# Patient Record
Sex: FEMALE | Race: WHITE | NOT HISPANIC OR LATINO | Employment: FULL TIME | URBAN - METROPOLITAN AREA
[De-identification: names, ages, dates, MRNs, and addresses within clinical notes are randomized per-mention and may not be internally consistent; named-entity substitution may affect disease eponyms.]

---

## 2023-09-11 LAB
ALANINE AMINOTRANSFERASE (SGPT) (U/L) IN SER/PLAS: 14 U/L (ref 7–45)
ALBUMIN (G/DL) IN SER/PLAS: 4.3 G/DL (ref 3.4–5)
ALKALINE PHOSPHATASE (U/L) IN SER/PLAS: 72 U/L (ref 33–110)
ANION GAP IN SER/PLAS: 13 MMOL/L (ref 10–20)
ASPARTATE AMINOTRANSFERASE (SGOT) (U/L) IN SER/PLAS: 18 U/L (ref 9–39)
BILIRUBIN TOTAL (MG/DL) IN SER/PLAS: 0.3 MG/DL (ref 0–1.2)
CALCIUM (MG/DL) IN SER/PLAS: 9.8 MG/DL (ref 8.6–10.6)
CARBON DIOXIDE, TOTAL (MMOL/L) IN SER/PLAS: 28 MMOL/L (ref 21–32)
CHLORIDE (MMOL/L) IN SER/PLAS: 104 MMOL/L (ref 98–107)
CREATININE (MG/DL) IN SER/PLAS: 0.69 MG/DL (ref 0.5–1.05)
GFR FEMALE: >90 ML/MIN/1.73M2
GLUCOSE (MG/DL) IN SER/PLAS: 95 MG/DL (ref 74–99)
POTASSIUM (MMOL/L) IN SER/PLAS: 4.5 MMOL/L (ref 3.5–5.3)
PROTEIN TOTAL: 7.8 G/DL (ref 6.4–8.2)
SODIUM (MMOL/L) IN SER/PLAS: 140 MMOL/L (ref 136–145)
UREA NITROGEN (MG/DL) IN SER/PLAS: 19 MG/DL (ref 6–23)

## 2023-11-11 PROBLEM — C85.90 LYMPHOMA (MULTI): Status: ACTIVE | Noted: 2023-11-11

## 2023-11-13 ENCOUNTER — OFFICE VISIT (OUTPATIENT)
Dept: PRIMARY CARE | Facility: CLINIC | Age: 43
End: 2023-11-13
Payer: COMMERCIAL

## 2023-11-13 VITALS
OXYGEN SATURATION: 97 % | SYSTOLIC BLOOD PRESSURE: 109 MMHG | HEART RATE: 84 BPM | DIASTOLIC BLOOD PRESSURE: 77 MMHG | TEMPERATURE: 97.5 F | BODY MASS INDEX: 35.53 KG/M2 | HEIGHT: 60 IN | WEIGHT: 181 LBS

## 2023-11-13 DIAGNOSIS — E78.2 MIXED HYPERLIPIDEMIA: ICD-10-CM

## 2023-11-13 DIAGNOSIS — Z11.59 NEED FOR HEPATITIS C SCREENING TEST: ICD-10-CM

## 2023-11-13 DIAGNOSIS — E55.9 VITAMIN D DEFICIENCY: ICD-10-CM

## 2023-11-13 DIAGNOSIS — Z80.0 FAMILY HX OF COLON CANCER: ICD-10-CM

## 2023-11-13 DIAGNOSIS — Z00.00 ANNUAL PHYSICAL EXAM: Primary | ICD-10-CM

## 2023-11-13 DIAGNOSIS — F32.A ANXIETY AND DEPRESSION: ICD-10-CM

## 2023-11-13 DIAGNOSIS — Z23 ENCOUNTER FOR IMMUNIZATION: ICD-10-CM

## 2023-11-13 DIAGNOSIS — Z12.31 ENCOUNTER FOR SCREENING MAMMOGRAM FOR BREAST CANCER: ICD-10-CM

## 2023-11-13 DIAGNOSIS — R73.9 HYPERGLYCEMIA: ICD-10-CM

## 2023-11-13 DIAGNOSIS — Z12.11 COLON CANCER SCREENING: ICD-10-CM

## 2023-11-13 DIAGNOSIS — Z01.89 ENCOUNTER FOR ROUTINE LABORATORY TESTING: ICD-10-CM

## 2023-11-13 DIAGNOSIS — F41.9 ANXIETY AND DEPRESSION: ICD-10-CM

## 2023-11-13 PROCEDURE — 1036F TOBACCO NON-USER: CPT | Performed by: NURSE PRACTITIONER

## 2023-11-13 PROCEDURE — 90686 IIV4 VACC NO PRSV 0.5 ML IM: CPT | Performed by: NURSE PRACTITIONER

## 2023-11-13 PROCEDURE — 99386 PREV VISIT NEW AGE 40-64: CPT | Performed by: NURSE PRACTITIONER

## 2023-11-13 RX ORDER — PROPRANOLOL HYDROCHLORIDE 10 MG/1
TABLET ORAL
COMMUNITY
Start: 2023-10-23

## 2023-11-13 RX ORDER — DULOXETIN HYDROCHLORIDE 20 MG/1
20 CAPSULE, DELAYED RELEASE ORAL 2 TIMES DAILY
COMMUNITY
Start: 2023-10-24

## 2023-11-13 ASSESSMENT — PATIENT HEALTH QUESTIONNAIRE - PHQ9
2. FEELING DOWN, DEPRESSED OR HOPELESS: NOT AT ALL
SUM OF ALL RESPONSES TO PHQ9 QUESTIONS 1 AND 2: 0
1. LITTLE INTEREST OR PLEASURE IN DOING THINGS: NOT AT ALL

## 2023-11-13 ASSESSMENT — ENCOUNTER SYMPTOMS
BLOOD IN STOOL: 0
CHILLS: 0
COUGH: 0
DIZZINESS: 0
NAUSEA: 0
FEVER: 0
LOSS OF SENSATION IN FEET: 0
POLYDIPSIA: 0
NECK PAIN: 0
SLEEP DISTURBANCE: 0
OCCASIONAL FEELINGS OF UNSTEADINESS: 0
CHEST TIGHTNESS: 0
DYSURIA: 0
APPETITE CHANGE: 0
HEADACHES: 0
DEPRESSION: 0
VOMITING: 0
DIAPHORESIS: 0
FATIGUE: 0
SEIZURES: 0
FLANK PAIN: 0
WOUND: 0
CONFUSION: 0
BACK PAIN: 0
POLYPHAGIA: 0
ABDOMINAL PAIN: 0
UNEXPECTED WEIGHT CHANGE: 0
HEMATURIA: 0
PALPITATIONS: 0
LIGHT-HEADEDNESS: 0
ACTIVITY CHANGE: 0
SPEECH DIFFICULTY: 0
NERVOUS/ANXIOUS: 1
WHEEZING: 0
SHORTNESS OF BREATH: 0

## 2023-11-13 ASSESSMENT — PAIN SCALES - GENERAL: PAINLEVEL: 0-NO PAIN

## 2023-11-13 NOTE — PROGRESS NOTES
[facility]: [facility]  PHYSICAL EXAM      Jaz Low is a 43 y.o. female that is presenting today for New Patient to Establish and CPE.  She is followed by Dr. Chinmay Landaverde for Lymphoma and was last seen 09/2023. She is scheduled to follow up 03/2024. Patient is followed by psychiatry through [facility] for depression and anxiety. This is managed well with Cymbalta and Propanolol. She reports a FMHX of colon cancer and was to have initial colonoscopy with previous provider but was unable to follow through with that. Requesting referral.    Assessment/Plan   Diagnoses and all orders for this visit:  Annual physical exam  Encounter for routine laboratory testing  -     CBC; Future  -     Basic Metabolic Panel; Future  -     Lipid Panel; Future  -     Hemoglobin A1C; Future  -     Vitamin D 25-Hydroxy,Total (for eval of Vitamin D levels); Future  Mixed hyperlipidemia  -     Lipid Panel; Future  Encounter for screening mammogram for breast cancer  -     BI mammo bilateral screening tomosynthesis; Future  Need for hepatitis C screening test  -     Hepatitis C antibody; Future  Anxiety and depression  Hyperglycemia  -     Hemoglobin A1C; Future  Encounter for immunization  -     Flu vaccine (IIV4) age 6 months and greater, preservative free  Vitamin D deficiency  -     Vitamin D 25-Hydroxy,Total (for eval of Vitamin D levels); Future  Colon cancer screening  -     Referral to Gastroenterology; Future  Family hx of colon cancer  -     Referral to Gastroenterology; Future  Subjective   HPI  Review of Systems   Constitutional:  Negative for activity change, appetite change, chills, diaphoresis, fatigue, fever and unexpected weight change.   HENT:  Negative for hearing loss and mouth sores.    Eyes:  Negative for visual disturbance.        Wears corrective lenses   Respiratory:  Negative for cough, chest tightness, shortness of breath and wheezing.    Cardiovascular:  Negative for chest pain,  palpitations and leg swelling.   Gastrointestinal:  Negative for abdominal pain, blood in stool, nausea and vomiting.   Endocrine: Negative for cold intolerance, heat intolerance, polydipsia, polyphagia and polyuria.   Genitourinary:  Negative for dysuria, flank pain and hematuria.   Musculoskeletal:  Negative for back pain and neck pain.   Skin:  Negative for rash and wound.   Allergic/Immunologic: Negative for environmental allergies and food allergies.   Neurological:  Negative for dizziness, seizures, syncope, speech difficulty, light-headedness and headaches.   Psychiatric/Behavioral:  Negative for confusion, sleep disturbance and suicidal ideas. The patient is nervous/anxious (depresssion followed by psychiatry).       Objective   Vitals:    11/13/23 1110   BP: 109/77   Pulse: 84   Temp: 36.4 °C (97.5 °F)   SpO2: 97%     Body mass index is 35.35 kg/m².  Physical Exam  Vitals and nursing note reviewed.   Constitutional:       General: She is not in acute distress.     Appearance: Normal appearance. She is not ill-appearing.   HENT:      Head: Normocephalic and atraumatic.      Right Ear: Tympanic membrane, ear canal and external ear normal. There is no impacted cerumen.      Left Ear: Tympanic membrane, ear canal and external ear normal. There is no impacted cerumen.      Nose: Nose normal.      Mouth/Throat:      Mouth: Mucous membranes are moist.      Pharynx: Oropharynx is clear. No oropharyngeal exudate or posterior oropharyngeal erythema.   Eyes:      General: No scleral icterus.        Right eye: No discharge.         Left eye: No discharge.      Extraocular Movements: Extraocular movements intact.      Conjunctiva/sclera: Conjunctivae normal.      Pupils: Pupils are equal, round, and reactive to light.   Neck:      Vascular: No carotid bruit.   Cardiovascular:      Rate and Rhythm: Normal rate and regular rhythm.      Pulses: Normal pulses.      Heart sounds: Normal heart sounds. No murmur heard.     No  "friction rub. No gallop.   Pulmonary:      Effort: Pulmonary effort is normal. No respiratory distress.      Breath sounds: Normal breath sounds.   Abdominal:      General: Abdomen is flat. Bowel sounds are normal. There is no distension.      Palpations: Abdomen is soft.      Tenderness: There is no abdominal tenderness.      Hernia: No hernia is present.   Musculoskeletal:         General: No swelling or tenderness. Normal range of motion.   Lymphadenopathy:      Cervical: No cervical adenopathy.   Skin:     General: Skin is warm and dry.      Capillary Refill: Capillary refill takes less than 2 seconds.      Coloration: Skin is not jaundiced.      Findings: No rash.   Neurological:      General: No focal deficit present.      Mental Status: She is alert and oriented to person, place, and time. Mental status is at baseline.   Psychiatric:         Mood and Affect: Mood normal.         Behavior: Behavior normal.       Diagnostic Results   Lab Results   Component Value Date    GLUCOSE 95 09/11/2023    CALCIUM 9.8 09/11/2023     09/11/2023    K 4.5 09/11/2023    CO2 28 09/11/2023     09/11/2023    BUN 19 09/11/2023    CREATININE 0.69 09/11/2023     Lab Results   Component Value Date    ALT 14 09/11/2023    AST 18 09/11/2023    ALKPHOS 72 09/11/2023    BILITOT 0.3 09/11/2023     Lab Results   Component Value Date    WBC 5.1 07/10/2023    HGB 11.6 (L) 07/10/2023    HCT 34.9 (L) 07/10/2023    MCV 86 07/10/2023     07/10/2023     Lab Results   Component Value Date    CHOL 245 (H) 11/16/2022    CHOL 205 (H) 10/19/2020    CHOL 217 (H) 09/28/2019     Lab Results   Component Value Date    HDL 53.0 11/16/2022    HDL 53.0 10/19/2020    HDL 49.0 09/28/2019     No results found for: \"LDLCALC\"  Lab Results   Component Value Date    TRIG 144 11/16/2022    TRIG 96 10/19/2020    TRIG 81 09/28/2019     No components found for: \"CHOLHDL\"  No results found for: \"HGBA1C\"  Other labs not included in the list above were " reviewed either before or during this encounter.    History   Past Medical History:   Diagnosis Date    Anemia     Anxiety     Cancer (CMS/HCC)     Depression      Past Surgical History:   Procedure Laterality Date    CT GUIDED PERCUTANEOUS BIOPSY MUSCLE  9/14/2021    CT GUIDED PERCUTANEOUS BIOPSY MUSCLE 9/14/2021 [facility]     Family History   Problem Relation Name Age of Onset    Other (Diabetes) Mother      Hyperlipidemia Father       Social History     Socioeconomic History    Marital status:      Spouse name: Not on file    Number of children: Not on file    Years of education: Not on file    Highest education level: Not on file   Occupational History    Not on file   Tobacco Use    Smoking status: Former     Types: Cigarettes     Passive exposure: Past    Smokeless tobacco: Never   Vaping Use    Vaping Use: Never used   Substance and Sexual Activity    Alcohol use: Yes    Drug use: Never    Sexual activity: Not on file   Other Topics Concern    Not on file   Social History Narrative    Not on file     Social Determinants of Health     Financial Resource Strain: Not on file   Food Insecurity: Not on file   Transportation Needs: Not on file   Physical Activity: Not on file   Stress: Not on file   Social Connections: Not on file   Intimate Partner Violence: Not on file   Housing Stability: Not on file     No Known Allergies  Current Outpatient Medications on File Prior to Visit   Medication Sig Dispense Refill    DULoxetine (Cymbalta) 20 mg DR capsule Take 1 capsule (20 mg) by mouth 2 times a day.      propranolol (Inderal) 10 mg tablet prn       No current facility-administered medications on file prior to visit.     Immunization History   Administered Date(s) Administered    Pfizer Purple Cap SARS-CoV-2 03/27/2021, 04/18/2021     Patient's medical history was reviewed and updated either before or during this encounter.       Shelly Goldsmith, APRN-CNP

## 2024-03-05 ENCOUNTER — TELEPHONE (OUTPATIENT)
Dept: PRIMARY CARE | Facility: CLINIC | Age: 44
End: 2024-03-05
Payer: COMMERCIAL

## 2024-03-05 NOTE — TELEPHONE ENCOUNTER
PATIENT STATES THAT SHE HAS A SCORE  THROAT ON THE RIGHT SIDE AND THE RIGHT SIDE OF HER EAR AND THIS STARTED SUNDAY PATIENT STATES THAT SHE HAS NO OTHER SYMPTOMS PLEASE ADVISE

## 2024-03-05 NOTE — TELEPHONE ENCOUNTER
Advise patient to utilize OTC medications to manage symptoms. It has only been 3 days. If she develops fevers or symptoms persist after 7 days, contact the office.

## 2024-03-05 NOTE — TELEPHONE ENCOUNTER
Pt advised as noted, pt states she has had strep in the past and states this feels the same, pt has [facility] appointment tonite and will obtain a strep culture.

## 2024-03-12 ENCOUNTER — APPOINTMENT (OUTPATIENT)
Dept: PRIMARY CARE | Facility: CLINIC | Age: 44
End: 2024-03-12
Payer: COMMERCIAL

## 2024-03-25 ENCOUNTER — APPOINTMENT (OUTPATIENT)
Dept: HEMATOLOGY/ONCOLOGY | Facility: CLINIC | Age: 44
End: 2024-03-25
Payer: COMMERCIAL

## 2024-03-29 ENCOUNTER — LAB (OUTPATIENT)
Dept: LAB | Facility: CLINIC | Age: 44
End: 2024-03-29
Payer: COMMERCIAL

## 2024-03-29 DIAGNOSIS — D50.0 IRON DEFICIENCY ANEMIA DUE TO CHRONIC BLOOD LOSS: ICD-10-CM

## 2024-03-29 DIAGNOSIS — E78.2 MIXED HYPERLIPIDEMIA: ICD-10-CM

## 2024-03-29 DIAGNOSIS — E55.9 VITAMIN D DEFICIENCY: ICD-10-CM

## 2024-03-29 DIAGNOSIS — R63.5 WEIGHT GAIN, ABNORMAL: ICD-10-CM

## 2024-03-29 DIAGNOSIS — D50.0 IRON DEFICIENCY ANEMIA DUE TO CHRONIC BLOOD LOSS: Primary | ICD-10-CM

## 2024-03-29 DIAGNOSIS — C83.30 DIFFUSE LARGE B-CELL LYMPHOMA, UNSPECIFIED BODY REGION (MULTI): ICD-10-CM

## 2024-03-29 DIAGNOSIS — Z01.89 ENCOUNTER FOR ROUTINE LABORATORY TESTING: ICD-10-CM

## 2024-03-29 DIAGNOSIS — R73.9 HYPERGLYCEMIA: ICD-10-CM

## 2024-03-29 DIAGNOSIS — Z11.59 NEED FOR HEPATITIS C SCREENING TEST: ICD-10-CM

## 2024-03-29 LAB
25(OH)D3 SERPL-MCNC: 33 NG/ML (ref 30–100)
ALBUMIN SERPL BCP-MCNC: 4.2 G/DL (ref 3.4–5)
ALP SERPL-CCNC: 67 U/L (ref 33–110)
ALT SERPL W P-5'-P-CCNC: 17 U/L (ref 7–45)
ANION GAP SERPL CALC-SCNC: 12 MMOL/L (ref 10–20)
AST SERPL W P-5'-P-CCNC: 22 U/L (ref 9–39)
BASOPHILS # BLD AUTO: 0.02 X10*3/UL (ref 0–0.1)
BASOPHILS NFR BLD AUTO: 0.4 %
BILIRUB SERPL-MCNC: 0.3 MG/DL (ref 0–1.2)
BUN SERPL-MCNC: 14 MG/DL (ref 6–23)
CALCIUM SERPL-MCNC: 9.1 MG/DL (ref 8.6–10.3)
CHLORIDE SERPL-SCNC: 105 MMOL/L (ref 98–107)
CHOLEST SERPL-MCNC: 245 MG/DL (ref 0–199)
CHOLESTEROL/HDL RATIO: 4.5
CO2 SERPL-SCNC: 26 MMOL/L (ref 21–32)
CREAT SERPL-MCNC: 0.64 MG/DL (ref 0.5–1.05)
EGFRCR SERPLBLD CKD-EPI 2021: >90 ML/MIN/1.73M*2
EOSINOPHIL # BLD AUTO: 0.02 X10*3/UL (ref 0–0.7)
EOSINOPHIL NFR BLD AUTO: 0.4 %
ERYTHROCYTE [DISTWIDTH] IN BLOOD BY AUTOMATED COUNT: 13.2 % (ref 11.5–14.5)
ERYTHROCYTE [DISTWIDTH] IN BLOOD BY AUTOMATED COUNT: 13.3 % (ref 11.5–14.5)
EST. AVERAGE GLUCOSE BLD GHB EST-MCNC: 105 MG/DL
FERRITIN SERPL-MCNC: 62 NG/ML (ref 8–150)
GLUCOSE SERPL-MCNC: 93 MG/DL (ref 74–99)
HBA1C MFR BLD: 5.3 %
HCT VFR BLD AUTO: 35.7 % (ref 36–46)
HCT VFR BLD AUTO: 35.8 % (ref 36–46)
HCV AB SER QL: NONREACTIVE
HDLC SERPL-MCNC: 54.3 MG/DL
HGB BLD-MCNC: 11.5 G/DL (ref 12–16)
HGB BLD-MCNC: 11.6 G/DL (ref 12–16)
IMM GRANULOCYTES # BLD AUTO: 0.01 X10*3/UL (ref 0–0.7)
IMM GRANULOCYTES NFR BLD AUTO: 0.2 % (ref 0–0.9)
IRON SATN MFR SERPL: 11 % (ref 25–45)
IRON SERPL-MCNC: 41 UG/DL (ref 35–150)
LDH SERPL L TO P-CCNC: 183 U/L (ref 84–246)
LDLC SERPL CALC-MCNC: 176 MG/DL
LYMPHOCYTES # BLD AUTO: 2.09 X10*3/UL (ref 1.2–4.8)
LYMPHOCYTES NFR BLD AUTO: 41.4 %
MCH RBC QN AUTO: 27.6 PG (ref 26–34)
MCH RBC QN AUTO: 28 PG (ref 26–34)
MCHC RBC AUTO-ENTMCNC: 32.1 G/DL (ref 32–36)
MCHC RBC AUTO-ENTMCNC: 32.5 G/DL (ref 32–36)
MCV RBC AUTO: 86 FL (ref 80–100)
MCV RBC AUTO: 86 FL (ref 80–100)
MONOCYTES # BLD AUTO: 0.24 X10*3/UL (ref 0.1–1)
MONOCYTES NFR BLD AUTO: 4.8 %
NEUTROPHILS # BLD AUTO: 2.67 X10*3/UL (ref 1.2–7.7)
NEUTROPHILS NFR BLD AUTO: 52.8 %
NON HDL CHOLESTEROL: 191 MG/DL (ref 0–149)
NRBC BLD-RTO: 0 /100 WBCS (ref 0–0)
NRBC BLD-RTO: 0 /100 WBCS (ref 0–0)
PLATELET # BLD AUTO: 268 X10*3/UL (ref 150–450)
PLATELET # BLD AUTO: 279 X10*3/UL (ref 150–450)
POTASSIUM SERPL-SCNC: 4 MMOL/L (ref 3.5–5.3)
PROT SERPL-MCNC: 7.8 G/DL (ref 6.4–8.2)
RBC # BLD AUTO: 4.15 X10*6/UL (ref 4–5.2)
RBC # BLD AUTO: 4.16 X10*6/UL (ref 4–5.2)
SODIUM SERPL-SCNC: 139 MMOL/L (ref 136–145)
TIBC SERPL-MCNC: 373 UG/DL (ref 240–445)
TRIGL SERPL-MCNC: 73 MG/DL (ref 0–149)
UIBC SERPL-MCNC: 332 UG/DL (ref 110–370)
VLDL: 15 MG/DL (ref 0–40)
WBC # BLD AUTO: 4.9 X10*3/UL (ref 4.4–11.3)
WBC # BLD AUTO: 5.1 X10*3/UL (ref 4.4–11.3)

## 2024-03-29 PROCEDURE — 80061 LIPID PANEL: CPT

## 2024-03-29 PROCEDURE — 83615 LACTATE (LD) (LDH) ENZYME: CPT

## 2024-03-29 PROCEDURE — 83540 ASSAY OF IRON: CPT

## 2024-03-29 PROCEDURE — 84443 ASSAY THYROID STIM HORMONE: CPT

## 2024-03-29 PROCEDURE — 86803 HEPATITIS C AB TEST: CPT

## 2024-03-29 PROCEDURE — 82728 ASSAY OF FERRITIN: CPT

## 2024-03-29 PROCEDURE — 83036 HEMOGLOBIN GLYCOSYLATED A1C: CPT

## 2024-03-29 PROCEDURE — 36415 COLL VENOUS BLD VENIPUNCTURE: CPT

## 2024-03-29 PROCEDURE — 85025 COMPLETE CBC W/AUTO DIFF WBC: CPT

## 2024-03-29 PROCEDURE — 82306 VITAMIN D 25 HYDROXY: CPT

## 2024-03-29 PROCEDURE — 80053 COMPREHEN METABOLIC PANEL: CPT

## 2024-03-29 PROCEDURE — 85027 COMPLETE CBC AUTOMATED: CPT

## 2024-04-01 ENCOUNTER — OFFICE VISIT (OUTPATIENT)
Dept: HEMATOLOGY/ONCOLOGY | Facility: CLINIC | Age: 44
End: 2024-04-01
Payer: COMMERCIAL

## 2024-04-01 VITALS
TEMPERATURE: 96.4 F | RESPIRATION RATE: 18 BRPM | WEIGHT: 191.8 LBS | SYSTOLIC BLOOD PRESSURE: 113 MMHG | OXYGEN SATURATION: 99 % | HEIGHT: 60 IN | DIASTOLIC BLOOD PRESSURE: 77 MMHG | HEART RATE: 93 BPM | BODY MASS INDEX: 37.66 KG/M2

## 2024-04-01 DIAGNOSIS — C83.30 DIFFUSE LARGE B-CELL LYMPHOMA, UNSPECIFIED BODY REGION (MULTI): Primary | ICD-10-CM

## 2024-04-01 DIAGNOSIS — R63.5 WEIGHT GAIN, ABNORMAL: ICD-10-CM

## 2024-04-01 LAB — TSH SERPL-ACNC: 1.18 MIU/L (ref 0.44–3.98)

## 2024-04-01 PROCEDURE — 99213 OFFICE O/P EST LOW 20 MIN: CPT | Performed by: NURSE PRACTITIONER

## 2024-04-01 ASSESSMENT — PATIENT HEALTH QUESTIONNAIRE - PHQ9
2. FEELING DOWN, DEPRESSED OR HOPELESS: SEVERAL DAYS
10. IF YOU CHECKED OFF ANY PROBLEMS, HOW DIFFICULT HAVE THESE PROBLEMS MADE IT FOR YOU TO DO YOUR WORK, TAKE CARE OF THINGS AT HOME, OR GET ALONG WITH OTHER PEOPLE: SOMEWHAT DIFFICULT
SUM OF ALL RESPONSES TO PHQ9 QUESTIONS 1 AND 2: 1
1. LITTLE INTEREST OR PLEASURE IN DOING THINGS: NOT AT ALL

## 2024-04-01 ASSESSMENT — PAIN SCALES - GENERAL: PAINLEVEL: 0-NO PAIN

## 2024-04-01 ASSESSMENT — COLUMBIA-SUICIDE SEVERITY RATING SCALE - C-SSRS
2. HAVE YOU ACTUALLY HAD ANY THOUGHTS OF KILLING YOURSELF?: NO
1. IN THE PAST MONTH, HAVE YOU WISHED YOU WERE DEAD OR WISHED YOU COULD GO TO SLEEP AND NOT WAKE UP?: NO
6. HAVE YOU EVER DONE ANYTHING, STARTED TO DO ANYTHING, OR PREPARED TO DO ANYTHING TO END YOUR LIFE?: NO

## 2024-04-01 NOTE — PROGRESS NOTES
Pt presents to clinic for follow up with Jasiel Cordero for history of Large B-cell lymphoma.     Pharmacy location current on file. Allergies updated. Medications reconciled.     Per orders, pt will follow up in 6 months.     Patient verbalizes understanding of the above instructions with no further questions or concerns at this time.

## 2024-04-01 NOTE — PROGRESS NOTES
Patient ID: Jaz Low is a 43 y.o. female.    Cancer History  Large B Cell Lymphoma dx 9/2021    Treatment Synopsis:  CT guided biopsy of left supra-acetabular 9/14/2021.    Pathology: Left supra-acetabular lesion: Atypical lymphoid proliferation most consistent with large B-cell lymphoma   Histologic sections showed fragments of skeletal muscle and atypical proliferation consisting of large lymphoid cells with scant to moderate cytoplasm, hyperchromatic nuclei and prominent nucleoli admixed with scattered small lymphocytes, histiocytes and plasma cells in a necrotic background. There is an extensive crush artifact limiting the morphologic evaluation.               Cell of origin by Umberto criteria: Non-germinal center(CD10-, BCL6 minus, mum 1+)   C-MYC/BCL2: Not a double expressor (C-MYC-, BCL2-).                AE1/AE3 immunostain is negative.                                                            CD45 immunostain highlights lymphoid cells.                                                       CD20 and PAX5 immunostains highlights increased number of neoplastic B-cells.                                                 CD3 and CD5 immunostains highlights background T-cells.                                                 CD10 and BCL6 immunostains are negative in neoplastic B-cells.                                                     MUM1 immunostain highlights neoplastic B-cells (30-40%).                                                C-MYC immunostain is negative in neoplastic B-cells (20-30%).                                                       BCL-2 immunostain highlights T-cells.                                                    CD68 and  immunostains highlights scattered histiocytes.                                                      Ki67 immunostain shows high proliferation index (60-70%).                                               CD30 immunostain is negative.                              "  CYTOGENETIC/MOLECULAR STUDIES: B-cell rearrangement studies; FISH studies for BCL2, BCL6 and C-MYC  A.  LEFT SUPRA ACETABULAR LESION:                                                  --   CD21 and CD23 immunostains showed absence of follicular dendritic meshworks.                                                  --   CyclinD1 immunostain is negative in neoplastic B-cells.          B-Cell Gene Rearrangement-Positive  MYC and IGH/MYC Rearrangements NOT DETECTED                                                              GENETIC: FISH for MYC, BCL2, and BCL6 gene rearrangements to formally evaluate for a \"double/triple hit lymphoma\" are pending at the time of this report.                    History of Present Illness:  Oncologist - Dr Guido  Ortho-Oncologist - Dr. Negro    June 2021, developed left lower pelvis, hip pain   (Status post muscle relaxants, steroids and a chiropractor visit)  July 9, 2021 x-ray of hip negative   July 14, 2021 CT pelvis negative   Progressive pain requiring tramadol and then Percocet   August 31, 2021 MRI left hip, destructive lesion in the acetabulum with fracture  September 30, 2021 PET scan:diffuse hypermetabolic activity of the spleen, with a highly hypermetabolic intrasplenic mass consistent with diffuse large B-cell lymphoma.  There is also hypermetabolic perisplenic and retroperitoneal lymph nodes and hypermetabolic  lytic lesion of the left iliac bone which involves the acetabulum.  This would be considered stage IV disease   Port-A-Cath placed   Echocardiogram: EF: 60 to 65%   October 15, 2021 R-CHOP cycle 1   After cycle 2 admitted to the hospital with neutropenic fever and COVID-positive   November 17, 2021 discharged   January 28, 2022 received cycle 6 CHOP  December 10, 2021 MRI hip: Partially improved   February 16, 2022, PET scan: PET negative   August 10, 2022: PET scan: No recurrent disease   July 2022: Echo: EF 55 to 60%   August 2022, Port-A-Cath removed   " "    Interval History:  Patient returns today for routine follow-up evaluation for history of stage IV diffuse large B-cell lymphoma. Status post R-CHOP x6. Remission by PET scan 2022.   On presentation today she is feeling well. She denies B symptoms of fever, drenching night sweats, or weight loss.  No lymphadenopathy. She denies cough, chest pain or shortness of breath. No nausea or vomiting. No constipation or diarrhea. Denies neuropathy. No back pain.   She acknowledges mild fatigue but attributes to working full time with 2 young children. Her primary complaint is weight gain. She gained around 40 pounds during treatment. Increased appetite with steroids. Has been unable to lose weight. Weight was a struggle as a child and teen. Walks 12,000 steps a day. Cross fit twice  each week. Has tried many diet plans. Good meal choices. Evening snacks are a weakness.   Has appt with new OB to check hormone levels. No menstural cycle since cycle number 3 of chemotherapy.    Review of Systems:  A review of systems has been completed and are negative for complaints except what is stated in the HPI and/or past medical history    Allergies:  NKDA    Medications:  Medication list reviewed with patient and updated in EMR    Past Medical History:  Stage IV diffuse large B-cell lymphoma     Social History:  . 2 children ages 6 and 4  Former smoker - 15 years, quit in 2016   Works at [employer]     Family History:  Maternal grandmother with colon cancer in her late 50s  No other cancer in the family     Vital Signs:  /77 (BP Location: Right arm, Patient Position: Sitting, BP Cuff Size: Adult long)   Pulse 93   Temp 35.8 °C (96.4 °F) (Temporal)   Resp 18   Ht (S) 1.533 m (5' 0.35\")   Wt 87 kg (191 lb 12.8 oz)   SpO2 99%   BMI 37.02 kg/m²     Physical Exam:  ECO  Pain: 0  Constitutional: Well developed, awake/alert/oriented x3, no distress, alert and cooperative  Eyes: PER. sclera anicteric  ENMT: " Oral mucosa moist  Respiratory/Thorax: Breathing is non-labored. Lungs are clear to auscultation bilaterally. No adventitious breath sounds  Cardiovascular: S1-S2. Regular rate and rhythm. No murmurs, rubs, or gallops appreciated  Gastrointestinal: Abdomen soft nontender, nondistended, normal active bowel sounds. No hepatosplenomegly  Musculoskeletal: ROM intact, no joint swelling, normal strength  Extremities: normal extremities, no cyanosis, no edema, no clubbing  Neurologic: alert and oriented x3. Nonfocal exam. No myoclonus  Psychological: Pleasant, appropriate and easily engaged     Lab Results:  March 29, 2024  WBC 5.1, hemoglobin 11.6, hematocrit 35.7, platelets 279,000  CMP within normal limits,   Iron 41, TIBC 373, percent saturation 11, ferritin 62     July 10, 2023  WBC 5.1, hemoglobin 11.6, hematocrit 34.9, platelets 269,000  CMP within normal limits,       Radiology Results:  CT chest with contrast(3/9/2023)  No lymphadenopathy.                                                                           2 mm faint hypervascular focus in the anterior left hepatic lobe is indeterminate. Further imaging with dedicated hepatic mass protocol  CT abdomen pelvis(+)(3/9/2023)   Beta-2 microglobulin(5/8/2023)   CT chest abdomen pelvis(+)(9/11/2023) no evidence of disease     Assessment:   History of stage IV diffuse large B-cell lymphoma originating in the left acetabulum. Diagnosed September 14, 2021. PET scan with diffuse hypermetabolic activity of the spleen, with a highly hypermetabolic intrasplenic mass consistent with diffuse large B-cell lymphoma.  There is also hypermetabolic perisplenic and retroperitoneal lymph nodes and hypermetabolic  lytic lesion of the left iliac bone which involves the acetabulum. Now status post R-CHOP x6, remission by PET scan February 16, 2022.     Doing well no B symptoms or concerns about recurrent disease at this time.   Weight gain - will check TSH. She has appt  with gynecology.     Health Maintenance:  Due for 1st mammogram  Hx Iron Deficiency Anemia GI workup has been recommended     Plan:  Follow-up - 6 months  Labs prior - CBC, CMP and LDH          Jasiel Cordero, APRN-CNP

## 2024-04-10 ENCOUNTER — PATIENT MESSAGE (OUTPATIENT)
Dept: PRIMARY CARE | Facility: CLINIC | Age: 44
End: 2024-04-10
Payer: COMMERCIAL

## 2024-04-11 ENCOUNTER — TELEPHONE (OUTPATIENT)
Dept: PRIMARY CARE | Facility: CLINIC | Age: 44
End: 2024-04-11
Payer: COMMERCIAL

## 2024-04-11 NOTE — TELEPHONE ENCOUNTER
Pt informed that pt cam be seen 4/17/@ 8am if available.  Pt to check with her  and call back to schedule.

## 2024-04-11 NOTE — TELEPHONE ENCOUNTER
From: Jaz Low  To: Shelly Goldsmith  Sent: 4/10/2024 3:14 PM EDT  Subject: My , Henry Bravo! My  Henry was seen at the [facility] clinic and tested positive for the flu. When he had the clinician take his vitals, his blood pressure was 152/112. His blood pressure has been running high for some months now. He needs a primary physician. He is having a terrible time getting through to schedule an appointment. Is there a way he can establish being a patient with you for general care?

## 2024-04-22 ENCOUNTER — LAB (OUTPATIENT)
Dept: LAB | Facility: LAB | Age: 44
End: 2024-04-22
Payer: COMMERCIAL

## 2024-04-22 DIAGNOSIS — N91.1 SECONDARY AMENORRHEA: ICD-10-CM

## 2024-04-22 DIAGNOSIS — N95.1 MENOPAUSAL AND FEMALE CLIMACTERIC STATES: Primary | ICD-10-CM

## 2024-04-22 PROCEDURE — 83001 ASSAY OF GONADOTROPIN (FSH): CPT

## 2024-04-22 PROCEDURE — 82670 ASSAY OF TOTAL ESTRADIOL: CPT

## 2024-04-22 PROCEDURE — 87624 HPV HI-RISK TYP POOLED RSLT: CPT

## 2024-04-22 PROCEDURE — 88175 CYTOPATH C/V AUTO FLUID REDO: CPT

## 2024-04-22 PROCEDURE — 36415 COLL VENOUS BLD VENIPUNCTURE: CPT

## 2024-04-22 PROCEDURE — 83002 ASSAY OF GONADOTROPIN (LH): CPT

## 2024-04-22 PROCEDURE — 84146 ASSAY OF PROLACTIN: CPT

## 2024-04-23 LAB
ESTRADIOL SERPL-MCNC: <19 PG/ML
FSH SERPL-ACNC: 116 IU/L
LH SERPL-ACNC: 54.3 IU/L
PROLACTIN SERPL-MCNC: 13.9 UG/L (ref 3–20)

## 2024-04-24 ENCOUNTER — LAB REQUISITION (OUTPATIENT)
Dept: LAB | Facility: HOSPITAL | Age: 44
End: 2024-04-24
Payer: COMMERCIAL

## 2024-04-24 DIAGNOSIS — Z11.51 ENCOUNTER FOR SCREENING FOR HUMAN PAPILLOMAVIRUS (HPV): ICD-10-CM

## 2024-04-24 DIAGNOSIS — Z12.4 ENCOUNTER FOR SCREENING FOR MALIGNANT NEOPLASM OF CERVIX: ICD-10-CM

## 2024-04-24 DIAGNOSIS — Z01.419 ENCOUNTER FOR GYNECOLOGICAL EXAMINATION (GENERAL) (ROUTINE) WITHOUT ABNORMAL FINDINGS: ICD-10-CM

## 2024-04-30 ENCOUNTER — HOSPITAL ENCOUNTER (OUTPATIENT)
Dept: RADIOLOGY | Facility: EXTERNAL LOCATION | Age: 44
Discharge: HOME | End: 2024-04-30
Payer: COMMERCIAL

## 2024-04-30 DIAGNOSIS — S99.921A RIGHT FOOT INJURY, INITIAL ENCOUNTER: ICD-10-CM

## 2024-05-01 ENCOUNTER — OFFICE VISIT (OUTPATIENT)
Dept: SPORTS MEDICINE | Facility: CLINIC | Age: 44
End: 2024-05-01
Payer: COMMERCIAL

## 2024-05-01 VITALS
SYSTOLIC BLOOD PRESSURE: 116 MMHG | BODY MASS INDEX: 37.3 KG/M2 | WEIGHT: 190 LBS | HEART RATE: 81 BPM | DIASTOLIC BLOOD PRESSURE: 78 MMHG | HEIGHT: 60 IN

## 2024-05-01 DIAGNOSIS — S92.354A NONDISPLACED FRACTURE OF FIFTH METATARSAL BONE, RIGHT FOOT, INITIAL ENCOUNTER FOR CLOSED FRACTURE: Primary | ICD-10-CM

## 2024-05-01 DIAGNOSIS — M79.671 RIGHT FOOT PAIN: ICD-10-CM

## 2024-05-01 DIAGNOSIS — S99.921A RIGHT FOOT INJURY, INITIAL ENCOUNTER: ICD-10-CM

## 2024-05-01 PROCEDURE — 97116 GAIT TRAINING THERAPY: CPT | Performed by: NURSE PRACTITIONER

## 2024-05-01 PROCEDURE — 99214 OFFICE O/P EST MOD 30 MIN: CPT | Performed by: NURSE PRACTITIONER

## 2024-05-01 PROCEDURE — 1036F TOBACCO NON-USER: CPT | Performed by: NURSE PRACTITIONER

## 2024-05-01 PROCEDURE — 29515 APPLICATION SHORT LEG SPLINT: CPT | Performed by: NURSE PRACTITIONER

## 2024-05-01 ASSESSMENT — PATIENT HEALTH QUESTIONNAIRE - PHQ9
2. FEELING DOWN, DEPRESSED OR HOPELESS: NOT AT ALL
1. LITTLE INTEREST OR PLEASURE IN DOING THINGS: NOT AT ALL
SUM OF ALL RESPONSES TO PHQ9 QUESTIONS 1 AND 2: 0

## 2024-05-01 ASSESSMENT — ENCOUNTER SYMPTOMS
LOSS OF SENSATION IN FEET: 0
OCCASIONAL FEELINGS OF UNSTEADINESS: 0
MYALGIAS: 1
DEPRESSION: 0
CARDIOVASCULAR NEGATIVE: 1
CONSTITUTIONAL NEGATIVE: 1
RESPIRATORY NEGATIVE: 1
ARTHRALGIAS: 1

## 2024-05-01 ASSESSMENT — PAIN SCALES - GENERAL: PAINLEVEL: 3

## 2024-05-01 NOTE — PROGRESS NOTES
New patient  History Of Present Illness  Jaz Low is a 43 y.o. female presenting with RIGHT foot pain. Pt works as an . Pt states that on Friday morning, while bringing her empty garbage can up her driveway. She fell with all of her body weight on her RIGHT foot as she tripped on the curb. She took the weekend to rest and ice it, however it did not get any better. 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 She went to [facility] for further evaluation, xrays and was given a surgical boot. Pain is located lateral aspect of the foot as well as top and bottom of foot. Pain with all movement but at it's worst when putting weight fully on foot. Denies any numbness or tingling. Swelling and bruising present on lateral aspect of foot. No previous history of injury to foot. Rates current pain as a 3/10 describing it as a constant soreness.  We reviewed patient x-ray results in detail.  Patient has a mildly displaced fracture of the fifth metatarsal noted on previously obtained x-rays.  Patient has been fitted with an Ortho shoe but continues to have pain and disability.  After discussion we will fit patient with a short walking boot and a pair of crutches.  Patient can follow-up in 4 weeks for reevaluation and repeat x-ray and we can adjust treatment as indicated at that time.  Educated patient to start taking calcium and vitamin D to help facilitate healing.  Patient verbalizes understanding and agreement with plan of care.    Past Medical History  She has a past medical history of Anemia, Anxiety, Cancer (Multi), and Depression.    Surgical History  She has a past surgical history that includes CT guided percutaneous biopsy muscle (9/14/2021).     Social History  She reports that she has quit smoking. Her smoking use included cigarettes. She has been exposed to tobacco smoke. She has never used smokeless tobacco. She reports current alcohol  use. She reports that she does not use drugs.    Family History  Family History   Problem Relation Name Age of Onset    Other (Diabetes) Mother      Hyperlipidemia Father       Allergies  Pollen extracts    Review of Systems  Review of Systems   Constitutional: Negative.    Respiratory: Negative.     Cardiovascular: Negative.    Musculoskeletal:  Positive for arthralgias and myalgias.   All other systems reviewed and are negative.       Last Recorded Vitals  /78 (BP Location: Right arm, Patient Position: Sitting, BP Cuff Size: Large adult)   Pulse 81   Ht 1.524 m (5')   Wt 86.2 kg (190 lb)   BMI 37.11 kg/m²      Examination:  Right  5th Metatarsal  Edema: Positive.   Ecchymosis/Bruising: Positive.   Percussion Test: Positive 5th Metatarsal          Tuning Fork Test: Positive, 5th Metatarsal    Orientation:   Positive  Asymmetrical,because of the swelling.          ROM:   Positive, Decreased Toe flexion and extension due to pain and swelling.            Muscle Strength:   Positive +4/+5 Planar Flexion, Decreased due to pain and swelling  Positive +4/+5  Dorsi Flexion, Decreased due to pain and swelling        +5/+5 Inversion  +5/+5 Eversion        +5/+5 Plantarflexion in combination with inversion  +5/+5 Plantarflexion in combination with eversion          +5/+5 Dorsiflexion in combination with inversion (Posterior Tibialis)  +5/+5 Dorsiflexion in combination with eversion (Peroneal Brevis)  +5/+5 Dorsiflexion in combination with eversion and flexion of great toe (Peroneal Longus).            Palpation:   Positive, Painful and Tender to Palpation Right  5th Metatarsal           Vascular:   +2/+4 Dorsalis Pedis  +2/+4 Posterior Tibialis  Capillary Refill < 2 Seconds.        Leg/Ankle/Foot - Ankle Impingement:  Posterior Ankle Impingement Test: Negative.   Anterior Ankle Impingement Test: Negative.         Leg/Ankle/Foot - Ankle Instability:  Flexibility Test:  Positive.   Anterior Drawer Test:  Negative.    Talar Tilt Test:  Negative.   External Rotation Kleiger Plantar Flexion Test:  Negative.   External Rotation Kleiger Dorsiflexion Test:  Negative.   Squeeze Test:  Positive  Dorsiflexion Test:  Negative.   Posterior Tibial Instability Test: Negative.  Peroneal Tendon Instability Test:  Negative.  Horizontal Squeeze Test:  Positive.   Vertical Squeeze Test:  Positive.   Standing/Walking Test:  Positive, Painful.         Leg/Ankle/Foot - DVT:  Blas's Sign: Negative.         Leg/Ankle/Foot - Forefoot:  Strunsky Test:  Negative.   Gaenslen Maneuver Test:  Negative.   Metatarsal Tap Test:  Positive  5th Metatarsal  Crepitation Test:  Negative.         Leg/Ankle/Foot - Hindfoot Achilles/Calcaneus:  Murillo Compression Test: Negative.   Hoffa Sign: Negative.   Achilles Tendon Tap Test: Negative.   Heel Compression Test: Negative.         Leg/Ankle/Foot - Nerve Irritation:  Sumit Sign: Negative.   Digital Nerve Stretch Test: Negative.   Tinel Sign: Negative.   Tourniquet Sign: Negative.         Feet/Foot:   Positive BILATERAL  Varus foot     Imaging and Diagnostics Review:  Recent radiology images that were previously obtained in our facility were independently reviewed in the presence of the family.    Assessment   1. Nondisplaced fracture of fifth metatarsal bone, right foot, initial encounter for closed fracture    2. Right foot pain    3. Right foot injury, initial encounter        Plan   Treatment or Intervention:  May use PRICE therapy as needed.,   Possibility in future to start into Physical Therapy 1-2 times a week for 8-10 weeks with manual therapy as well as dry needling and IASTM,   Stressed the importance of wearing shoes with good stability control to help with the biomechanics affecting the lower legs,   Stressed the importance of wearing full foot insoles to help with the biomechanics affecting the lower legs,   Recommendation over-the-counter calcium 500mg, 3 times a day with vitamin-D3 9798-0855+  units a day with food as well as a daily multivitamin,   Recommendation over-the-counter Move Free for joint health,   May take OTC Tylenol Extra Strength or OTC Tylenol Arthritis, taking one every 6-8 hours with food as needed for pain management.,   Patient advised regarding the risk and/or potential adverse reactions and/or side effects of any prescribed medications along with any over-the-counter medications or any supplements used. Patient advised to seek immediate medical care if any adverse reactions occur. The patient and/or patient(s) parent(s) verbalized their understanding.,   Discussed in detail with the patient to the level of their understanding the possibility in the future of regenerative injections versus corticosteroids injections,   Possibility in future of MRI of RIGHT FOOT to rule out tendon vs ligament vs tear vs fracture vs other, MSK to read.  Patient was given a SHORT WALKING BOOT brace for their RIGHT FOOT injury/condition. The patient is ambulatory with or without aid and feels more stable with the brace on. The brace definitely helps improve their function as well as stability. Verbal and written instructions for the use, wear schedule, cleaning and application of this brace were given. Patient was instructed that should the brace result in increased pain, decreased sensation, numbness and/or tingling, increased swelling, or an overall worsening of their medical condition; to please contact our office immediately. Orthotic/brace management and training was provided for skin care, modifications due to healing tissues, edema changes, interruption in skin integrity and safety precautions with the Orthosis/brace.   Recommendation to be nonweightbearing status until further notice. Eventually the plan will be to transition the patient from a nonweightbearing status to a partial weight-bearing status only after the appropriate amount of time decided by the physician and/or TONY. Patient fitted  and given crutches to maintain nonweightbearing status until further notice. Gait training with crutches was performed by a staff member for nonweightbearing as well as partial weight-bearing. Additionally, patient teach back demonstrated for both nonweightbearing and partial weight-bearing with crutches. Also, recommendation the patient get an over-the-counter home knee scooter to help maintain nonweightbearing status until further notice as needed as well.  Diagnostic studies:  Urgent Care Xray  Narrative: Interpreted By:  Zaire Rucker,   STUDY:  [facility]; ;  4/30/2024 3:17 pm      INDICATION:  Signs/Symptoms:right foot injury, 5th mt pain.      COMPARISON:  None.      ACCESSION NUMBER(S):  [id number]      ORDERING CLINICIAN:  BECKI SHEPHERD      FINDINGS:  Please see the impression      Impression: Acute nondisplaced fracture involving the base of the 5th metatarsal.      Mild diffuse osteopenia.      No radiopaque foreign body          MACRO:  None      Signed by: Zaire Rucker 4/30/2024 4:02 PM  Dictation workstation:   [id number]     Activity Instructions, Restrictions, and Accommodations:      Consultations/Referrals:  Physical therapy    Follow-up:  Follow up 3-4 weeks BRITNEY Greer on 5/1/24 at 11:48 AM.     Britney Melo CNP

## 2024-05-01 NOTE — PATIENT INSTRUCTIONS
May use PRICE therapy as needed.,   Possibility in future to start into Physical Therapy 1-2 times a week for 8-10 weeks with manual therapy as well as dry needling and IASTM,   Stressed the importance of wearing shoes with good stability control to help with the biomechanics affecting the lower legs,   Stressed the importance of wearing full foot insoles to help with the biomechanics affecting the lower legs,   Recommendation over-the-counter calcium 500mg, 3 times a day with vitamin-D3 6400-1211+ units a day with food as well as a daily multivitamin,   Recommendation over-the-counter Move Free for joint health,   May take OTC Tylenol Extra Strength or OTC Tylenol Arthritis, taking one every 6-8 hours with food as needed for pain management.,   Patient advised regarding the risk and/or potential adverse reactions and/or side effects of any prescribed medications along with any over-the-counter medications or any supplements used. Patient advised to seek immediate medical care if any adverse reactions occur. The patient and/or patient(s) parent(s) verbalized their understanding.,   Discussed in detail with the patient to the level of their understanding the possibility in the future of regenerative injections versus corticosteroids injections,   Possibility in future of MRI of RIGHT FOOT to rule out tendon vs ligament vs tear vs fracture vs other, MSK to read.  Patient was given a SHORT WALKING BOOT brace for their RIGHT FOOT injury/condition. The patient is ambulatory with or without aid and feels more stable with the brace on. The brace definitely helps improve their function as well as stability. Verbal and written instructions for the use, wear schedule, cleaning and application of this brace were given. Patient was instructed that should the brace result in increased pain, decreased sensation, numbness and/or tingling, increased swelling, or an overall worsening of their medical condition; to please contact our  office immediately. Orthotic/brace management and training was provided for skin care, modifications due to healing tissues, edema changes, interruption in skin integrity and safety precautions with the Orthosis/brace.   Recommendation to be nonweightbearing status until further notice. Eventually the plan will be to transition the patient from a nonweightbearing status to a partial weight-bearing status only after the appropriate amount of time decided by the physician and/or TONY. Patient fitted and given crutches to maintain nonweightbearing status until further notice. Gait training with crutches was performed by a staff member for nonweightbearing as well as partial weight-bearing. Additionally, patient teach back demonstrated for both nonweightbearing and partial weight-bearing with crutches. Also, recommendation the patient get an over-the-counter home knee scooter to help maintain nonweightbearing status until further notice as needed as well.   Follow up 3-4 weeks jennifer

## 2024-05-20 ASSESSMENT — ENCOUNTER SYMPTOMS
RESPIRATORY NEGATIVE: 1
ARTHRALGIAS: 1
MYALGIAS: 1
CONSTITUTIONAL NEGATIVE: 1
CARDIOVASCULAR NEGATIVE: 1

## 2024-05-20 NOTE — PROGRESS NOTES
Established patient  History Of Present Illness  Jaz Low is a 43 y.o. female presenting for her follow up rexray of her RIGHT foot. Pt presents wearing short walking boot and crutches that she was previously given at last visit in office. Since last visit in office, patient feels improved. She also notes that she got a knee scooter to help her get around. Rates current pain as a 2/10 describing it as a slight discomfort.  We reviewed patient x-ray results in detail.  Patient verbalizes understanding of results.  We discussed treatment options and will continue with current course of treatment as patient has shown improvement in her pain and disability since her last visit.  Patient can follow-up in 4 weeks for laura-ray and reevaluation and we can adjust treatment as indicated at that time.  Patient verbalizes understanding and agreement with plan of care.    Past Medical History  She has a past medical history of Anemia, Anxiety, Cancer (Multi), and Depression.    Surgical History  She has a past surgical history that includes CT guided percutaneous biopsy muscle (9/14/2021).     Social History  She reports that she has quit smoking. Her smoking use included cigarettes. She has been exposed to tobacco smoke. She has never used smokeless tobacco. She reports current alcohol use. She reports that she does not use drugs.    Family History  Family History   Problem Relation Name Age of Onset    Other (Diabetes) Mother      Hyperlipidemia Father       Allergies  Pollen extracts    Review of Systems  Review of Systems   Constitutional: Negative.    Respiratory: Negative.     Cardiovascular: Negative.    Musculoskeletal:  Positive for arthralgias and myalgias.   All other systems reviewed and are negative.       Last Recorded Vitals  /78 (BP Location: Right arm, Patient Position: Sitting, BP Cuff Size: Large adult)   Pulse 81   Ht 1.524 m (5')   Wt 86.2 kg (190 lb)   BMI 37.11 kg/m²      Examination:  Right   5th Metatarsal  Edema: Negative.  Ecchymosis/Bruising: Negative.   Percussion Test: Positive 5th Metatarsal          Tuning Fork Test: Negative.     Orientation:   Negative.         ROM:   Negative.            Muscle Strength:   Positive +4/+5 Planar Flexion, Decreased due to pain and swelling  Positive +4/+5  Dorsi Flexion, Decreased due to pain and swelling        +5/+5 Inversion  +5/+5 Eversion        +5/+5 Plantarflexion in combination with inversion  +5/+5 Plantarflexion in combination with eversion          +5/+5 Dorsiflexion in combination with inversion (Posterior Tibialis)  +5/+5 Dorsiflexion in combination with eversion (Peroneal Brevis)  +5/+5 Dorsiflexion in combination with eversion and flexion of great toe (Peroneal Longus).             Palpation:   Positive, Painful and Tender to Palpation Right  5th Metatarsal           Vascular:   +2/+4 Dorsalis Pedis  +2/+4 Posterior Tibialis  Capillary Refill < 2 Seconds.        Leg/Ankle/Foot - Ankle Impingement:  Posterior Ankle Impingement Test: Negative.   Anterior Ankle Impingement Test: Negative.          Leg/Ankle/Foot - Ankle Instability:  Flexibility Test:  Negative.  Anterior Drawer Test:  Negative.   Talar Tilt Test:  Negative.   External Rotation Kleiger Plantar Flexion Test:  Negative.   External Rotation Kleiger Dorsiflexion Test:  Negative.   Squeeze Test:  Negative.  Dorsiflexion Test:  Negative.   Posterior Tibial Instability Test: Negative.  Peroneal Tendon Instability Test:  Negative.  Horizontal Squeeze Test:  Negative.   Vertical Squeeze Test:  Negative.  Standing/Walking Test: Negative.         Leg/Ankle/Foot - DVT:  Blas's Sign: Negative.          Leg/Ankle/Foot - Forefoot:  Strunsky Test:  Negative.   Gaenslen Maneuver Test:  Negative.   Metatarsal Tap Test:  Positive  5th Metatarsal  Crepitation Test:  Negative.          Leg/Ankle/Foot - Hindfoot Achilles/Calcaneus:  Murillo Compression Test: Negative.   Hoffa Sign: Negative.   Achilles  Tendon Tap Test: Negative.   Heel Compression Test: Negative.          Leg/Ankle/Foot - Nerve Irritation:  Sumit Sign: Negative.   Digital Nerve Stretch Test: Negative.   Tinel Sign: Negative.   Tourniquet Sign: Negative.          Feet/Foot:   Positive BILATERAL  Varus foot      Imaging and Diagnostics Review:  Plain radiographs were ordered and independently reviewed in the presence of the family.    Assessment   1. Nondisplaced fracture of fifth metatarsal bone, right foot, subsequent encounter for fracture with routine healing    2. Right foot pain    3. Right foot injury, subsequent encounter      Plan   Treatment or Intervention:  May use PRICE therapy as needed.,   Possibility in future to start into Physical Therapy 1-2 times a week for 8-10 weeks with manual therapy as well as dry needling and IASTM,   Again stressed the importance of wearing shoes with good stability control to help with the biomechanics affecting the lower legs,   Again stressed the importance of wearing full foot insoles to help with the biomechanics affecting the lower legs,   Recommendation over-the-counter calcium 500mg, 3 times a day with vitamin-D3 8899-9904+ units a day with food as well as a daily multivitamin,   Recommendation over-the-counter Move Free for joint health,   May take OTC Tylenol Extra Strength or OTC Tylenol Arthritis, taking one every 6-8 hours with food as needed for pain management.,   Patient advised regarding the risk and/or potential adverse reactions and/or side effects of any prescribed medications along with any over-the-counter medications or any supplements used. Patient advised to seek immediate medical care if any adverse reactions occur. The patient and/or patient(s) parent(s) verbalized their understanding.,   Discussed in detail with the patient to the level of their understanding the possibility in the future of regenerative injections versus corticosteroids injections,   Possibility in future of  MRI of RIGHT FOOT to rule out tendon vs ligament vs tear vs fracture vs other, MSK to read.  Patient to continue wearing SHORT WALKING BOOT brace for their RIGHT FOOT injury/condition.       Diagnostic studies:  Urgent Care Xray  Narrative: Interpreted By:  Zaire Rucker,   STUDY:  [facility]; ;  4/30/2024 3:17 pm      INDICATION:  Signs/Symptoms:right foot injury, 5th mt pain.      COMPARISON:  None.      ACCESSION NUMBER(S):  [id number]      ORDERING CLINICIAN:  BECKI SHEPHERD      FINDINGS:  Please see the impression      Impression: Acute nondisplaced fracture involving the base of the 5th metatarsal.      Mild diffuse osteopenia.      No radiopaque foreign body          MACRO:  None      Signed by: Zaire Rucker 4/30/2024 4:02 PM  Dictation workstation:   [id number]     Activity Instructions, Restrictions, and Accommodations:      Consultations/Referrals:  Physical therapy    Follow-up:  Follow up 4 weeks jennifer  Total appointment time _30_ minutes. Greater than 50% spent counseling patient on results of physical exam, treatment options as well as results of ordered imaging and treatment for results, need for PT and expected outcomes, as well as discussing possible medications.    JASON CUI on 5/21/24 at 11:10 AM.     Jason Cui CNP

## 2024-05-20 NOTE — PATIENT INSTRUCTIONS
May use PRICE therapy as needed.,   Possibility in future to start into Physical Therapy 1-2 times a week for 8-10 weeks with manual therapy as well as dry needling and IASTM,   Again stressed the importance of wearing shoes with good stability control to help with the biomechanics affecting the lower legs,   Again stressed the importance of wearing full foot insoles to help with the biomechanics affecting the lower legs,   Recommendation over-the-counter calcium 500mg, 3 times a day with vitamin-D3 9971-5132+ units a day with food as well as a daily multivitamin,   Recommendation over-the-counter Move Free for joint health,   May take OTC Tylenol Extra Strength or OTC Tylenol Arthritis, taking one every 6-8 hours with food as needed for pain management.,   Patient advised regarding the risk and/or potential adverse reactions and/or side effects of any prescribed medications along with any over-the-counter medications or any supplements used. Patient advised to seek immediate medical care if any adverse reactions occur. The patient and/or patient(s) parent(s) verbalized their understanding.,   Discussed in detail with the patient to the level of their understanding the possibility in the future of regenerative injections versus corticosteroids injections,   Possibility in future of MRI of RIGHT FOOT to rule out tendon vs ligament vs tear vs fracture vs other, MSK to read.  Patient to continue wearing SHORT WALKING BOOT brace for their RIGHT FOOT injury/condition.   Recommendation to continue using crutches nonweightbearing status until further notice.  Follow up 4 weeks jennifer

## 2024-05-21 ENCOUNTER — HOSPITAL ENCOUNTER (OUTPATIENT)
Dept: RADIOLOGY | Facility: CLINIC | Age: 44
Discharge: HOME | End: 2024-05-21
Payer: COMMERCIAL

## 2024-05-21 ENCOUNTER — OFFICE VISIT (OUTPATIENT)
Dept: SPORTS MEDICINE | Facility: CLINIC | Age: 44
End: 2024-05-21
Payer: COMMERCIAL

## 2024-05-21 VITALS
HEART RATE: 81 BPM | BODY MASS INDEX: 37.3 KG/M2 | WEIGHT: 190 LBS | HEIGHT: 60 IN | DIASTOLIC BLOOD PRESSURE: 78 MMHG | SYSTOLIC BLOOD PRESSURE: 116 MMHG

## 2024-05-21 DIAGNOSIS — S99.921D RIGHT FOOT INJURY, SUBSEQUENT ENCOUNTER: ICD-10-CM

## 2024-05-21 DIAGNOSIS — M79.671 RIGHT FOOT PAIN: ICD-10-CM

## 2024-05-21 DIAGNOSIS — S92.354D NONDISPLACED FRACTURE OF FIFTH METATARSAL BONE, RIGHT FOOT, SUBSEQUENT ENCOUNTER FOR FRACTURE WITH ROUTINE HEALING: Primary | ICD-10-CM

## 2024-05-21 DIAGNOSIS — S92.354D NONDISPLACED FRACTURE OF FIFTH METATARSAL BONE, RIGHT FOOT, SUBSEQUENT ENCOUNTER FOR FRACTURE WITH ROUTINE HEALING: ICD-10-CM

## 2024-05-21 PROCEDURE — 73630 X-RAY EXAM OF FOOT: CPT | Mod: RIGHT SIDE | Performed by: RADIOLOGY

## 2024-05-21 PROCEDURE — 73630 X-RAY EXAM OF FOOT: CPT | Mod: RT

## 2024-05-21 PROCEDURE — 1036F TOBACCO NON-USER: CPT | Performed by: NURSE PRACTITIONER

## 2024-05-21 PROCEDURE — 99214 OFFICE O/P EST MOD 30 MIN: CPT | Performed by: NURSE PRACTITIONER

## 2024-05-21 ASSESSMENT — ENCOUNTER SYMPTOMS
LOSS OF SENSATION IN FEET: 0
OCCASIONAL FEELINGS OF UNSTEADINESS: 0
DEPRESSION: 0

## 2024-05-21 ASSESSMENT — PAIN SCALES - GENERAL: PAINLEVEL: 2

## 2024-05-23 ENCOUNTER — APPOINTMENT (OUTPATIENT)
Dept: SPORTS MEDICINE | Facility: CLINIC | Age: 44
End: 2024-05-23
Payer: COMMERCIAL

## 2024-06-13 ASSESSMENT — ENCOUNTER SYMPTOMS
CARDIOVASCULAR NEGATIVE: 1
RESPIRATORY NEGATIVE: 1
CONSTITUTIONAL NEGATIVE: 1
ARTHRALGIAS: 1
MYALGIAS: 1

## 2024-06-13 NOTE — PROGRESS NOTES
Established patient  History Of Present Illness  Jaz Low is a 43 y.o. female presenting for her follow up rexray of her RIGHT foot. Pt is not wearing her SHORT walking boot that was previously instructed and fitted to wear. She states that she has been out of her walking boot for a couple of days, but acknowledges hat she might have done so prematurely. Since last visit in office, patient states that she is in less pain. She sates her pain  gets worse throughout the day as does her swelling. Her pain also gets worse when she is no wearing supportive shoes. She sates her foot feels stiff and tight. She rates her pain at 2/10 today.  We reviewed patient x-ray results in detail.  Patient shows continued healing of previous fracture with still clearly visible fracture line.  Encourage patient to resume wearing her walking boot and continue her calcium vitamin D.  Patient will resume wearing her walking boot and follow-up in 4 weeks for laura-ray and reevaluation.  Patient will continue with physical therapy.  Patient verbalizes understanding and agreement with plan of care.    Past Medical History  She has a past medical history of Anemia, Anxiety, Cancer (Multi), and Depression.    Surgical History  She has a past surgical history that includes CT guided percutaneous biopsy muscle (9/14/2021).     Social History  She reports that she has quit smoking. Her smoking use included cigarettes. She has been exposed to tobacco smoke. She has never used smokeless tobacco. She reports current alcohol use. She reports that she does not use drugs.    Family History  Family History   Problem Relation Name Age of Onset    Other (Diabetes) Mother      Hyperlipidemia Father       Allergies  Pollen extracts    Review of Systems  Review of Systems   Constitutional: Negative.    Respiratory: Negative.     Cardiovascular: Negative.    Musculoskeletal:  Positive for arthralgias and myalgias.   All other systems reviewed and are  negative.       Last Recorded Vitals  /62 (BP Location: Right arm, Patient Position: Sitting, BP Cuff Size: Adult)   Pulse 82   Ht 1.524 m (5')   Wt 86.2 kg (190 lb)   BMI 37.11 kg/m²      The  HUMBERTO SIMPSON, was present during today's visit and not limited to physical examination!     Examination:  Right  5th Metatarsal  Edema: Negative.  Ecchymosis/Bruising: Negative.   Percussion Test: Positive 5th Metatarsal          Tuning Fork Test: Negative.     Orientation:   Negative.         ROM:   Negative.            Muscle Strength:   Positive +4/+5 Plantar Flexion, Decreased due to pain and swelling  Positive +4/+5  Dorsi Flexion, Decreased due to pain and swelling        +5/+5 Inversion  +5/+5 Eversion        +5/+5 Plantarflexion in combination with inversion  +5/+5 Plantarflexion in combination with eversion          +5/+5 Dorsiflexion in combination with inversion (Posterior Tibialis)  +5/+5 Dorsiflexion in combination with eversion (Peroneal Brevis)  +5/+5 Dorsiflexion in combination with eversion and flexion of great toe (Peroneal Longus).             Palpation:   Positive, Painful and Tender to Palpation Right  5th Metatarsal           Vascular:   +2/+4 Dorsalis Pedis  +2/+4 Posterior Tibialis  Capillary Refill < 2 Seconds.        Leg/Ankle/Foot - Ankle Impingement:  Posterior Ankle Impingement Test: Negative.   Anterior Ankle Impingement Test: Negative.          Leg/Ankle/Foot - Ankle Instability:  Flexibility Test:  Negative.  Anterior Drawer Test:  Negative.   Talar Tilt Test:  Negative.   External Rotation Kleiger Plantar Flexion Test:  Negative.   External Rotation Kleiger Dorsiflexion Test:  Negative.   Squeeze Test:  Negative.  Dorsiflexion Test:  Negative.   Posterior Tibial Instability Test: Negative.  Peroneal Tendon Instability Test:  Negative.  Horizontal Squeeze Test:  Negative.   Vertical Squeeze Test:  Negative.  Standing/Walking Test: Negative.         Leg/Ankle/Foot -  DVT:  Blas's Sign: Negative.          Leg/Ankle/Foot - Forefoot:  Strunsky Test:  Negative.   Gaenslen Maneuver Test:  Negative.   Metatarsal Tap Test:  Positive  5th Metatarsal  Crepitation Test:  Negative.          Leg/Ankle/Foot - Hindfoot Achilles/Calcaneus:  Murillo Compression Test: Negative.   Hoffa Sign: Negative.   Achilles Tendon Tap Test: Negative.   Heel Compression Test: Negative.          Leg/Ankle/Foot - Nerve Irritation:  Sumit Sign: Negative.   Digital Nerve Stretch Test: Negative.   Tinel Sign: Negative.   Tourniquet Sign: Negative.          Feet/Foot:   Positive BILATERAL  Varus foot     Imaging and Diagnostics Review:  Plain radiographs were ordered and independently reviewed in the presence of the family.    Assessment   1. Right foot pain    2. Right foot injury, subsequent encounter    3. Nondisplaced fracture of fifth metatarsal bone, right foot, subsequent encounter for fracture with routine healing        Plan   Treatment or Intervention:  May use PRICE therapy as needed.,   Possibility in future to start into Physical Therapy 1-2 times a week for 8-10 weeks with manual therapy as well as dry needling and IASTM,   Again stressed the importance of wearing shoes with good stability control to help with the biomechanics affecting the lower legs,   Again stressed the importance of wearing full foot insoles to help with the biomechanics affecting the lower legs,   Recommendation over-the-counter calcium 500mg, 3 times a day with vitamin-D3 0540-7923+ units a day with food as well as a daily multivitamin,   Recommendation over-the-counter Move Free for joint health,   May take OTC Tylenol Extra Strength or OTC Tylenol Arthritis, taking one every 6-8 hours with food as needed for pain management.,   Patient advised regarding the risk and/or potential adverse reactions and/or side effects of any prescribed medications along with any over-the-counter medications or any supplements used. Patient  advised to seek immediate medical care if any adverse reactions occur. The patient and/or patient(s) parent(s) verbalized their understanding.,   Discussed in detail with the patient to the level of their understanding the possibility in the future of regenerative injections versus corticosteroids injections,   Possibility in future of MRI of RIGHT FOOT to rule out tendon vs ligament vs tear vs fracture vs other, MSK to read.  Patient to continue wearing SHORT WALKING BOOT brace for their RIGHT FOOT injury/condition.     Diagnostic studies:  XR foot right 3+ views  Narrative: Interpreted By:  Alexis Bautista,   STUDY:  XR FOOT RIGHT 3+ VIEWS      INDICATION:  Signs/Symptoms:RIGHT FOOT PAIN.      COMPARISON:  None      ACCESSION NUMBER(S):  [id number]      ORDERING CLINICIAN:  BRITNEY MELO      FINDINGS:  Nondisplaced right 5th metatarsal base fracture. No dislocation. No  other osseous abnormality.      Impression: Nondisplaced right 5th metatarsal base fracture.      Signed by: Alexis Bautista 5/22/2024 6:23 PM  Dictation workstation:   [id number]     Activity Instructions, Restrictions, and Accommodations:  Continue to wear SHORT walking boot    Consultations/Referrals:  None.    Follow-up:  4 weeks   Total appointment time _30_ minutes. Greater than 50% spent counseling patient on results of physical exam, treatment options as well as results of ordered imaging and treatment for results, need for PT and expected outcomes, as well as discussing possible medications.    HUMBERTO ANAND on 6/17/24 at 9:47 AM.     Britney Melo CNP

## 2024-06-13 NOTE — PATIENT INSTRUCTIONS
May use PRICE therapy as needed.,   Possibility in future to start into Physical Therapy 1-2 times a week for 8-10 weeks with manual therapy as well as dry needling and IASTM,   Again stressed the importance of wearing shoes with good stability control to help with the biomechanics affecting the lower legs,   Again stressed the importance of wearing full foot insoles to help with the biomechanics affecting the lower legs,   Recommendation over-the-counter calcium 500mg, 3 times a day with vitamin-D3 1709-8961+ units a day with food as well as a daily multivitamin,   Recommendation over-the-counter Move Free for joint health,   May take OTC Tylenol Extra Strength or OTC Tylenol Arthritis, taking one every 6-8 hours with food as needed for pain management.,   Patient advised regarding the risk and/or potential adverse reactions and/or side effects of any prescribed medications along with any over-the-counter medications or any supplements used. Patient advised to seek immediate medical care if any adverse reactions occur. The patient verbalized their understanding.,   Discussed in detail with the patient to the level of their understanding the possibility in the future of regenerative injections versus corticosteroids injections,   Possibility in future of MRI of RIGHT FOOT to rule out tendon vs ligament vs tear vs fracture vs other, MSK to read.  Patient to continue wearing SHORT WALKING BOOT brace for their RIGHT FOOT injury/condition.  Follow up 4 weeks

## 2024-06-17 ENCOUNTER — HOSPITAL ENCOUNTER (OUTPATIENT)
Dept: RADIOLOGY | Facility: CLINIC | Age: 44
Discharge: HOME | End: 2024-06-17
Payer: COMMERCIAL

## 2024-06-17 ENCOUNTER — OFFICE VISIT (OUTPATIENT)
Dept: SPORTS MEDICINE | Facility: CLINIC | Age: 44
End: 2024-06-17
Payer: COMMERCIAL

## 2024-06-17 VITALS
DIASTOLIC BLOOD PRESSURE: 62 MMHG | BODY MASS INDEX: 37.3 KG/M2 | HEART RATE: 82 BPM | HEIGHT: 60 IN | SYSTOLIC BLOOD PRESSURE: 118 MMHG | WEIGHT: 190 LBS

## 2024-06-17 DIAGNOSIS — M79.671 RIGHT FOOT PAIN: ICD-10-CM

## 2024-06-17 DIAGNOSIS — S92.354D NONDISPLACED FRACTURE OF FIFTH METATARSAL BONE, RIGHT FOOT, SUBSEQUENT ENCOUNTER FOR FRACTURE WITH ROUTINE HEALING: ICD-10-CM

## 2024-06-17 DIAGNOSIS — S99.921D RIGHT FOOT INJURY, SUBSEQUENT ENCOUNTER: ICD-10-CM

## 2024-06-17 PROCEDURE — 73630 X-RAY EXAM OF FOOT: CPT | Mod: RIGHT SIDE | Performed by: RADIOLOGY

## 2024-06-17 PROCEDURE — 99214 OFFICE O/P EST MOD 30 MIN: CPT | Performed by: NURSE PRACTITIONER

## 2024-06-17 PROCEDURE — 1036F TOBACCO NON-USER: CPT | Performed by: NURSE PRACTITIONER

## 2024-06-17 PROCEDURE — 73630 X-RAY EXAM OF FOOT: CPT | Mod: RT

## 2024-06-17 ASSESSMENT — PAIN - FUNCTIONAL ASSESSMENT: PAIN_FUNCTIONAL_ASSESSMENT: 0-10

## 2024-06-17 ASSESSMENT — ENCOUNTER SYMPTOMS
LOSS OF SENSATION IN FEET: 0
OCCASIONAL FEELINGS OF UNSTEADINESS: 0
DEPRESSION: 0

## 2024-06-17 ASSESSMENT — PAIN SCALES - GENERAL
PAINLEVEL_OUTOF10: 2
PAINLEVEL: 2

## 2024-06-17 ASSESSMENT — PAIN DESCRIPTION - DESCRIPTORS: DESCRIPTORS: NAGGING

## 2024-07-14 ASSESSMENT — ENCOUNTER SYMPTOMS
RESPIRATORY NEGATIVE: 1
ARTHRALGIAS: 1
CONSTITUTIONAL NEGATIVE: 1
CARDIOVASCULAR NEGATIVE: 1
MYALGIAS: 1

## 2024-07-14 NOTE — PATIENT INSTRUCTIONS
May use PRICE therapy as needed.,   Possibility in future to start into Physical Therapy 1-2 times a week for 8-10 weeks with manual therapy as well as dry needling and IASTM,   Again stressed the importance of wearing shoes with good stability control to help with the biomechanics affecting the lower legs,   Again stressed the importance of wearing full foot insoles to help with the biomechanics affecting the lower legs,   Recommendation over-the-counter calcium 500mg, 3 times a day with vitamin-D3 2140-3442+ units a day with food as well as a daily multivitamin,   Recommendation over-the-counter Move Free for joint health,   May take OTC Tylenol Extra Strength or OTC Tylenol Arthritis, taking one every 6-8 hours with food as needed for pain management.,   Patient advised regarding the risk and/or potential adverse reactions and/or side effects of any prescribed medications along with any over-the-counter medications or any supplements used. Patient advised to seek immediate medical care if any adverse reactions occur. The patient and/or patient(s) parent(s) verbalized their understanding.,   Discussed in detail with the patient to the level of their understanding the possibility in the future of regenerative injections versus corticosteroids injections,   Possibility in future of MRI of RIGHT FOOT to rule out tendon vs ligament vs tear vs fracture vs other, MSK to read.  Patient to suggested to continue wearing SHORT WALKING BOOT brace for their RIGHT FOOT injury/condition. However, may transition to wearing a supported shoe. Patient educated that outside of boot there is still an increase change of re-injury/fracture.  Follow up 4 weeks for jennifer

## 2024-07-14 NOTE — PROGRESS NOTES
Established patient  History Of Present Illness  Jaz Low is a 43 y.o. female presenting for her follow up rexray of her RIGHT foot. Pt presents wearing the short walking boot as previously fitted and given to wear. Since last visit in office, patient feels better. Rates current 0/10. Notes that she does have some occasional numbness and tingling on the outside of her foot around her fifth toe.  We reviewed patient x-ray results in detail.  Patient verbalizes understanding of results.  We discussed treatment options and will allow patient to transition into a regular athletic shoe as she is able to bear weight and toe walk with minimal to no pain.  Advised patient to avoid any high impact activity.  Patient can follow-up in 4 weeks for laura-ray or sooner if her pain worsens.  Patient will continue calcium and vitamin D and we can reevaluate at follow-up appointment.  Patient verbalizes understanding and agreement with plan of care.    Past Medical History  She has a past medical history of Anemia, Anxiety, Cancer (Multi), and Depression.    Surgical History  She has a past surgical history that includes CT guided percutaneous biopsy muscle (9/14/2021).     Social History  She reports that she has quit smoking. Her smoking use included cigarettes. She has been exposed to tobacco smoke. She has never used smokeless tobacco. She reports current alcohol use. She reports that she does not use drugs.    Family History  Family History   Problem Relation Name Age of Onset    Other (Diabetes) Mother      Hyperlipidemia Father       Allergies  Pollen extracts    Review of Systems  Review of Systems   Constitutional: Negative.    Respiratory: Negative.     Cardiovascular: Negative.    Musculoskeletal:  Positive for arthralgias and myalgias.   All other systems reviewed and are negative.    Last Recorded Vitals  /64 (BP Location: Right arm, Patient Position: Sitting, BP Cuff Size: Adult)   Pulse 82   Ht 1.524 m (5')    Wt 85.3 kg (188 lb)   BMI 36.72 kg/m²      The  NEDRA ABEBE, was present during today's visit and not limited to physical examination!     Examination:  Right  5th Metatarsal  Edema: Negative.  Ecchymosis/Bruising: Negative.   Percussion Test: Positive 5th Metatarsal          Tuning Fork Test: Negative.     Orientation:   Negative.         ROM:   Negative.            Muscle Strength:   +5/+5 Plantar Flexion,   +5/+5  Dorsi Flexion,         +5/+5 Inversion  +5/+5 Eversion        +5/+5 Plantarflexion in combination with inversion  +5/+5 Plantarflexion in combination with eversion          +5/+5 Dorsiflexion in combination with inversion (Posterior Tibialis)  +5/+5 Dorsiflexion in combination with eversion (Peroneal Brevis)  +5/+5 Dorsiflexion in combination with eversion and flexion of great toe (Peroneal Longus).             Palpation:   Positive, Painful and Tender to Palpation Right  5th Metatarsal           Vascular:   +2/+4 Dorsalis Pedis  +2/+4 Posterior Tibialis  Capillary Refill < 2 Seconds.        Leg/Ankle/Foot - Ankle Impingement:  Posterior Ankle Impingement Test: Negative.   Anterior Ankle Impingement Test: Negative.          Leg/Ankle/Foot - Ankle Instability:  Flexibility Test:  Negative.  Anterior Drawer Test:  Negative.   Talar Tilt Test:  Negative.   External Rotation Kleiger Plantar Flexion Test:  Negative.   External Rotation Kleiger Dorsiflexion Test:  Negative.   Squeeze Test:  Negative.  Dorsiflexion Test:  Negative.   Posterior Tibial Instability Test: Negative.  Peroneal Tendon Instability Test:  Negative.  Horizontal Squeeze Test:  Negative.   Vertical Squeeze Test:  Negative.  Standing/Walking Test: Negative.         Leg/Ankle/Foot - DVT:  Blas's Sign: Negative.          Leg/Ankle/Foot - Forefoot:  Strunsky Test:  Negative.   Gaenslen Maneuver Test:  Negative.   Metatarsal Tap Test:  Positive  5th Metatarsal  Crepitation Test:  Negative.          Leg/Ankle/Foot - Hindfoot  Achilles/Calcaneus:  Murillo Compression Test: Negative.   Hoffa Sign: Negative.   Achilles Tendon Tap Test: Negative.   Heel Compression Test: Negative.          Leg/Ankle/Foot - Nerve Irritation:  Sumit Sign: Negative.   Digital Nerve Stretch Test: Negative.   Tinel Sign: Negative.   Tourniquet Sign: Negative.          Feet/Foot:   Positive BILATERAL  Varus foot     Imaging and Diagnostics Review:  Plain radiographs were ordered and independently reviewed in the presence of the family.    Assessment   1. Nondisplaced fracture of fifth metatarsal bone, right foot, subsequent encounter for fracture with routine healing    2. Right foot pain    3. Right foot injury, subsequent encounter      Plan   Treatment or Intervention:  May use PRICE therapy as needed.,   Possibility in future to start into Physical Therapy 1-2 times a week for 8-10 weeks with manual therapy as well as dry needling and IASTM,   Again stressed the importance of wearing shoes with good stability control to help with the biomechanics affecting the lower legs,   Again stressed the importance of wearing full foot insoles to help with the biomechanics affecting the lower legs,   Recommendation over-the-counter calcium 500mg, 3 times a day with vitamin-D3 9884-1080+ units a day with food as well as a daily multivitamin,   Recommendation over-the-counter Move Free for joint health,   May take OTC Tylenol Extra Strength or OTC Tylenol Arthritis, taking one every 6-8 hours with food as needed for pain management.,   Patient advised regarding the risk and/or potential adverse reactions and/or side effects of any prescribed medications along with any over-the-counter medications or any supplements used. Patient advised to seek immediate medical care if any adverse reactions occur. The patient and/or patient(s) parent(s) verbalized their understanding.,   Discussed in detail with the patient to the level of their understanding the possibility in the  future of regenerative injections versus corticosteroids injections,   Possibility in future of MRI of RIGHT FOOT to rule out tendon vs ligament vs tear vs fracture vs other, MSK to read.  Patient to suggested to continue wearing SHORT WALKING BOOT brace for their RIGHT FOOT injury/condition. However, may transition to wearing a supported shoe. Patient educated that outside of boot there is still an increase change of re-injury/fracture.    Diagnostic studies:  XR foot right 3+ views  Narrative: Interpreted By:  Alexis Bautista,   STUDY:  XR FOOT RIGHT 3+ VIEWS      INDICATION:  Signs/Symptoms:right foot pain and fracture.      COMPARISON:  May 21      ACCESSION NUMBER(S):  [id number]      ORDERING CLINICIAN:  BRITNEY CUI      FINDINGS:  Right 5th metatarsal base fracture unchanged from prior study. No new  findings.      Impression: Unchanged appearance nondisplaced right 5th metatarsal base fracture.      Signed by: Alexis Bautista 6/18/2024 6:35 PM  Dictation workstation:   [id number]     Activity Instructions, Restrictions, and Accommodations:      Consultations/Referrals:  None.    Follow-up:  Follow up 4 weeks jennifer  Total appointment time _30_ minutes. Greater than 50% spent counseling patient on results of physical exam, treatment options as well as results of ordered imaging and treatment for results, need for PT and expected outcomes, as well as discussing possible medications.    BRITNEY CUI on 7/15/24 at 11:41 AM.     Britney Cui CNP

## 2024-07-15 ENCOUNTER — HOSPITAL ENCOUNTER (OUTPATIENT)
Dept: RADIOLOGY | Facility: CLINIC | Age: 44
Discharge: HOME | End: 2024-07-15
Payer: COMMERCIAL

## 2024-07-15 ENCOUNTER — OFFICE VISIT (OUTPATIENT)
Dept: SPORTS MEDICINE | Facility: CLINIC | Age: 44
End: 2024-07-15
Payer: COMMERCIAL

## 2024-07-15 VITALS
SYSTOLIC BLOOD PRESSURE: 118 MMHG | HEIGHT: 60 IN | WEIGHT: 188 LBS | HEART RATE: 82 BPM | DIASTOLIC BLOOD PRESSURE: 64 MMHG | BODY MASS INDEX: 36.91 KG/M2

## 2024-07-15 DIAGNOSIS — S99.921D RIGHT FOOT INJURY, SUBSEQUENT ENCOUNTER: ICD-10-CM

## 2024-07-15 DIAGNOSIS — M79.671 RIGHT FOOT PAIN: ICD-10-CM

## 2024-07-15 DIAGNOSIS — S92.354D NONDISPLACED FRACTURE OF FIFTH METATARSAL BONE, RIGHT FOOT, SUBSEQUENT ENCOUNTER FOR FRACTURE WITH ROUTINE HEALING: ICD-10-CM

## 2024-07-15 DIAGNOSIS — S92.354D NONDISPLACED FRACTURE OF FIFTH METATARSAL BONE, RIGHT FOOT, SUBSEQUENT ENCOUNTER FOR FRACTURE WITH ROUTINE HEALING: Primary | ICD-10-CM

## 2024-07-15 PROCEDURE — 99214 OFFICE O/P EST MOD 30 MIN: CPT | Performed by: NURSE PRACTITIONER

## 2024-07-15 PROCEDURE — 73630 X-RAY EXAM OF FOOT: CPT | Mod: RIGHT SIDE | Performed by: RADIOLOGY

## 2024-07-15 PROCEDURE — 1036F TOBACCO NON-USER: CPT | Performed by: NURSE PRACTITIONER

## 2024-07-15 PROCEDURE — 73630 X-RAY EXAM OF FOOT: CPT | Mod: RT

## 2024-07-15 RX ORDER — SEMAGLUTIDE 1.34 MG/ML
0.5 INJECTION, SOLUTION SUBCUTANEOUS
COMMUNITY

## 2024-07-15 ASSESSMENT — PATIENT HEALTH QUESTIONNAIRE - PHQ9
SUM OF ALL RESPONSES TO PHQ9 QUESTIONS 1 AND 2: 0
2. FEELING DOWN, DEPRESSED OR HOPELESS: NOT AT ALL
1. LITTLE INTEREST OR PLEASURE IN DOING THINGS: NOT AT ALL

## 2024-07-15 ASSESSMENT — PAIN SCALES - GENERAL: PAINLEVEL: 0-NO PAIN

## 2024-07-15 ASSESSMENT — ENCOUNTER SYMPTOMS
DEPRESSION: 0
LOSS OF SENSATION IN FEET: 0
OCCASIONAL FEELINGS OF UNSTEADINESS: 0

## 2024-08-09 ASSESSMENT — ENCOUNTER SYMPTOMS
CARDIOVASCULAR NEGATIVE: 1
MYALGIAS: 1
CONSTITUTIONAL NEGATIVE: 1
RESPIRATORY NEGATIVE: 1
ARTHRALGIAS: 1

## 2024-08-09 NOTE — PROGRESS NOTES
Established patient  History Of Present Illness  Jaz Low is a 43 y.o. female presenting for her follow up rexray of her RIGHT foot. Since last visit in office, patient feels better. Rates current 0/10. Notes that she is doing personal training since last seen in office. She has not been doing any jumping to make sure that she doesn't stress foot.  We reviewed patient x-ray results in detail.  Patient x-rays show mild continued healing of her previously established fracture but fracture is still open and visible.  Patient is a 12 weeks since initiation of treatment.  We discussed treatment options.  And after discussion we will have patient continue with current course of treatment for 3-4 more weeks at which point we can laura-ray.  If patient shows no significant improvement in healing we will consider bone stimulator at that time.  Patient can continue with current activity and we can reevaluate at follow-up appointment.  Patient verbalizes understanding and agreement with plan of care.    Past Medical History  She has a past medical history of Anemia, Anxiety, Cancer (Multi), and Depression.    Surgical History  She has a past surgical history that includes CT guided percutaneous biopsy muscle (9/14/2021).     Social History  She reports that she has quit smoking. Her smoking use included cigarettes. She has been exposed to tobacco smoke. She has never used smokeless tobacco. She reports current alcohol use. She reports that she does not use drugs.    Family History  Family History   Problem Relation Name Age of Onset    Other (Diabetes) Mother      Hyperlipidemia Father       Allergies  Pollen extracts    Review of Systems  Review of Systems   Constitutional: Negative.    Respiratory: Negative.     Cardiovascular: Negative.    Musculoskeletal:  Positive for arthralgias and myalgias.   All other systems reviewed and are negative.    Last Recorded Vitals  /64 (BP Location: Right arm, Patient Position:  Sitting, BP Cuff Size: Adult)   Pulse 75   Ht 1.524 m (5')   Wt 85.3 kg (188 lb)   BMI 36.72 kg/m²      The  NEDRA ABEBE, was present during today's visit and not limited to physical examination!     Examination:  Right  5th Metatarsal  Edema: Negative.  Ecchymosis/Bruising: Negative.   Percussion Test: Negative.         Tuning Fork Test: Negative.     Orientation:   Negative.         ROM:   Negative.            Muscle Strength:   +5/+5 Plantar Flexion,   +5/+5  Dorsi Flexion,         +5/+5 Inversion  +5/+5 Eversion        +5/+5 Plantarflexion in combination with inversion  +5/+5 Plantarflexion in combination with eversion          +5/+5 Dorsiflexion in combination with inversion (Posterior Tibialis)  +5/+5 Dorsiflexion in combination with eversion (Peroneal Brevis)  +5/+5 Dorsiflexion in combination with eversion and flexion of great toe (Peroneal Longus).             Palpation:   Negative.           Vascular:   +2/+4 Dorsalis Pedis  +2/+4 Posterior Tibialis  Capillary Refill < 2 Seconds.        Leg/Ankle/Foot - Ankle Impingement:  Posterior Ankle Impingement Test: Negative.   Anterior Ankle Impingement Test: Negative.          Leg/Ankle/Foot - Ankle Instability:  Flexibility Test:  Negative.  Anterior Drawer Test:  Negative.   Talar Tilt Test:  Negative.   External Rotation Kleiger Plantar Flexion Test:  Negative.   External Rotation Kleiger Dorsiflexion Test:  Negative.   Squeeze Test:  Negative.  Dorsiflexion Test:  Negative.   Posterior Tibial Instability Test: Negative.  Peroneal Tendon Instability Test:  Negative.  Horizontal Squeeze Test:  Negative.   Vertical Squeeze Test:  Negative.  Standing/Walking Test: Negative.         Leg/Ankle/Foot - DVT:  Blas's Sign: Negative.          Leg/Ankle/Foot - Forefoot:  Strunsky Test:  Negative.   Gaenslen Maneuver Test:  Negative.   Metatarsal Tap Test:  Negative.  Crepitation Test:  Negative.          Leg/Ankle/Foot - Hindfoot  Achilles/Calcaneus:  Murillo Compression Test: Negative.   Hoffa Sign: Negative.   Achilles Tendon Tap Test: Negative.   Heel Compression Test: Negative.          Leg/Ankle/Foot - Nerve Irritation:  Sumit Sign: Negative.   Digital Nerve Stretch Test: Negative.   Tinel Sign: Negative.   Tourniquet Sign: Negative.          Feet/Foot:   Positive BILATERAL  Varus foot     Imaging and Diagnostics Review:  Plain radiographs were ordered and independently reviewed in the presence of the family.    Assessment   1. Nondisplaced fracture of fifth metatarsal bone, right foot, subsequent encounter for fracture with routine healing    2. Right foot pain    3. Right foot injury, subsequent encounter        Plan   Treatment or Intervention:  May use PRICE therapy as needed.,   Possibility in future to start into Physical Therapy 1-2 times a week for 8-10 weeks with manual therapy as well as dry needling and IASTM,   Again stressed the importance of wearing shoes with good stability control to help with the biomechanics affecting the lower legs,   Again stressed the importance of wearing full foot insoles to help with the biomechanics affecting the lower legs,   Recommendation over-the-counter calcium 500mg, 3 times a day with vitamin-D3 1715-9221+ units a day with food as well as a daily multivitamin,   Recommendation over-the-counter Move Free for joint health,   May take OTC Tylenol Extra Strength or OTC Tylenol Arthritis, taking one every 6-8 hours with food as needed for pain management.,   Patient advised regarding the risk and/or potential adverse reactions and/or side effects of any prescribed medications along with any over-the-counter medications or any supplements used. Patient advised to seek immediate medical care if any adverse reactions occur. The patient and/or patient(s) parent(s) verbalized their understanding.,   Discussed in detail with the patient to the level of their understanding the possibility in the  future of regenerative injections versus corticosteroids injections,   Possibility in future of MRI of RIGHT FOOT to rule out tendon vs ligament vs tear vs fracture vs other, MSK to read.  Patient to suggested to continue wearing SHORT WALKING BOOT brace for their RIGHT FOOT injury/condition. However, may transition to wearing a supported shoe. Patient educated that outside of boot there is still an increase change of re-injury/fracture.    Diagnostic studies:  XR foot right 3+ views  Narrative: Interpreted By:  Alexis Bautista,   STUDY:  XR FOOT RIGHT 3+ VIEWS      INDICATION:  Signs/Symptoms:RIGHT FOOT PAIN.      COMPARISON:  June 17, 2024      ACCESSION NUMBER(S):  [id number]      ORDERING CLINICIAN:  BRITNEY CUI      FINDINGS:  Nondisplaced fracture right 5th metatarsal base. Alignment unchanged.  No new findings.      Impression: Unchanged appearance nondisplaced right 5th metatarsal base fracture.      Signed by: Alexis Bautista 7/16/2024 7:08 AM  Dictation workstation:   [id number]     Activity Instructions, Restrictions, and Accommodations:      Consultations/Referrals:  None.    Follow-up:  Follow up 3-4 weeks rexrayTotal appointment time _30_ minutes. Greater than 50% spent counseling patient on results of physical exam, treatment options as well as results of ordered imaging and treatment for results, need for PT and expected outcomes, as well as discussing possible medications.    BRITNEY CUI on 8/12/24 at 9:12 AM.     Britney Cui CNP

## 2024-08-09 NOTE — PATIENT INSTRUCTIONS
May use PRICE therapy as needed.,   Possibility in future to start into Physical Therapy 1-2 times a week for 8-10 weeks with manual therapy as well as dry needling and IASTM,   Again stressed the importance of wearing shoes with good stability control to help with the biomechanics affecting the lower legs,   Again stressed the importance of wearing full foot insoles to help with the biomechanics affecting the lower legs,   Recommendation over-the-counter calcium 500mg, 3 times a day with vitamin-D3 9152-9182+ units a day with food as well as a daily multivitamin,   Recommendation over-the-counter Move Free for joint health,   May take OTC Tylenol Extra Strength or OTC Tylenol Arthritis, taking one every 6-8 hours with food as needed for pain management.,   Patient advised regarding the risk and/or potential adverse reactions and/or side effects of any prescribed medications along with any over-the-counter medications or any supplements used. Patient advised to seek immediate medical care if any adverse reactions occur. The patient and/or patient(s) parent(s) verbalized their understanding.,   Discussed in detail with the patient to the level of their understanding the possibility in the future of regenerative injections versus corticosteroids injections,   Possibility in future of MRI of RIGHT FOOT to rule out tendon vs ligament vs tear vs fracture vs other, MSK to read.  Patient to suggested to continue wearing SHORT WALKING BOOT brace for their RIGHT FOOT injury/condition. However, may transition to wearing a supported shoe. Patient educated that outside of boot there is still an increase change of re-injury/fracture.  Follow up 3-4 weeks jennifer

## 2024-08-12 ENCOUNTER — OFFICE VISIT (OUTPATIENT)
Dept: SPORTS MEDICINE | Facility: CLINIC | Age: 44
End: 2024-08-12
Payer: COMMERCIAL

## 2024-08-12 ENCOUNTER — HOSPITAL ENCOUNTER (OUTPATIENT)
Dept: RADIOLOGY | Facility: CLINIC | Age: 44
Discharge: HOME | End: 2024-08-12
Payer: COMMERCIAL

## 2024-08-12 VITALS
HEIGHT: 60 IN | SYSTOLIC BLOOD PRESSURE: 118 MMHG | DIASTOLIC BLOOD PRESSURE: 64 MMHG | BODY MASS INDEX: 36.91 KG/M2 | HEART RATE: 75 BPM | WEIGHT: 188 LBS

## 2024-08-12 DIAGNOSIS — S99.921D RIGHT FOOT INJURY, SUBSEQUENT ENCOUNTER: ICD-10-CM

## 2024-08-12 DIAGNOSIS — M79.671 RIGHT FOOT PAIN: ICD-10-CM

## 2024-08-12 DIAGNOSIS — S92.354D NONDISPLACED FRACTURE OF FIFTH METATARSAL BONE, RIGHT FOOT, SUBSEQUENT ENCOUNTER FOR FRACTURE WITH ROUTINE HEALING: ICD-10-CM

## 2024-08-12 DIAGNOSIS — S92.354D NONDISPLACED FRACTURE OF FIFTH METATARSAL BONE, RIGHT FOOT, SUBSEQUENT ENCOUNTER FOR FRACTURE WITH ROUTINE HEALING: Primary | ICD-10-CM

## 2024-08-12 PROCEDURE — 3008F BODY MASS INDEX DOCD: CPT | Performed by: NURSE PRACTITIONER

## 2024-08-12 PROCEDURE — 1036F TOBACCO NON-USER: CPT | Performed by: NURSE PRACTITIONER

## 2024-08-12 PROCEDURE — 99214 OFFICE O/P EST MOD 30 MIN: CPT | Performed by: NURSE PRACTITIONER

## 2024-08-12 PROCEDURE — 73630 X-RAY EXAM OF FOOT: CPT | Mod: RIGHT SIDE | Performed by: RADIOLOGY

## 2024-08-12 PROCEDURE — 73630 X-RAY EXAM OF FOOT: CPT | Mod: RT

## 2024-08-12 ASSESSMENT — ENCOUNTER SYMPTOMS
OCCASIONAL FEELINGS OF UNSTEADINESS: 0
DEPRESSION: 0
LOSS OF SENSATION IN FEET: 0

## 2024-08-12 ASSESSMENT — PAIN SCALES - GENERAL: PAINLEVEL: 0-NO PAIN

## 2024-09-05 ASSESSMENT — ENCOUNTER SYMPTOMS
MYALGIAS: 1
CARDIOVASCULAR NEGATIVE: 1
ARTHRALGIAS: 1
CONSTITUTIONAL NEGATIVE: 1
RESPIRATORY NEGATIVE: 1

## 2024-09-05 NOTE — PROGRESS NOTES
Established patient  History Of Present Illness  Jaz Low is a 44 y.o. female presenting for her follow up rexray of her RIGHT foot. Since last visit in office, patient feels better. Rates current /10.     Past Medical History  She has a past medical history of Anemia, Anxiety, Cancer (Multi), and Depression.    Surgical History  She has a past surgical history that includes CT guided percutaneous biopsy muscle (9/14/2021).     Social History  She reports that she has quit smoking. Her smoking use included cigarettes. She has been exposed to tobacco smoke. She has never used smokeless tobacco. She reports current alcohol use. She reports that she does not use drugs.    Family History  Family History   Problem Relation Name Age of Onset    Other (Diabetes) Mother      Hyperlipidemia Father       Allergies  Pollen extracts    Review of Systems  Review of Systems   Constitutional: Negative.    Respiratory: Negative.     Cardiovascular: Negative.    Musculoskeletal:  Positive for arthralgias and myalgias.   All other systems reviewed and are negative.    Last Recorded Vitals  There were no vitals taken for this visit.     The  NEDRA ABEBE, was present during today's visit and not limited to physical examination!     Examination:  Right  5th Metatarsal  Edema: Negative.  Ecchymosis/Bruising: Negative.   Percussion Test: Negative.         Tuning Fork Test: Negative.     Orientation:   Negative.         ROM:   Negative.            Muscle Strength:   +5/+5 Plantar Flexion,   +5/+5  Dorsi Flexion,         +5/+5 Inversion  +5/+5 Eversion        +5/+5 Plantarflexion in combination with inversion  +5/+5 Plantarflexion in combination with eversion          +5/+5 Dorsiflexion in combination with inversion (Posterior Tibialis)  +5/+5 Dorsiflexion in combination with eversion (Peroneal Brevis)  +5/+5 Dorsiflexion in combination with eversion and flexion of great toe (Peroneal Longus).             Palpation:    Negative.           Vascular:   +2/+4 Dorsalis Pedis  +2/+4 Posterior Tibialis  Capillary Refill < 2 Seconds.        Leg/Ankle/Foot - Ankle Impingement:  Posterior Ankle Impingement Test: Negative.   Anterior Ankle Impingement Test: Negative.          Leg/Ankle/Foot - Ankle Instability:  Flexibility Test:  Negative.  Anterior Drawer Test:  Negative.   Talar Tilt Test:  Negative.   External Rotation Kleiger Plantar Flexion Test:  Negative.   External Rotation Kleiger Dorsiflexion Test:  Negative.   Squeeze Test:  Negative.  Dorsiflexion Test:  Negative.   Posterior Tibial Instability Test: Negative.  Peroneal Tendon Instability Test:  Negative.  Horizontal Squeeze Test:  Negative.   Vertical Squeeze Test:  Negative.  Standing/Walking Test: Negative.         Leg/Ankle/Foot - DVT:  Blas's Sign: Negative.          Leg/Ankle/Foot - Forefoot:  Strunsky Test:  Negative.   Gaenslen Maneuver Test:  Negative.   Metatarsal Tap Test:  Negative.  Crepitation Test:  Negative.          Leg/Ankle/Foot - Hindfoot Achilles/Calcaneus:  Murillo Compression Test: Negative.   Hoffa Sign: Negative.   Achilles Tendon Tap Test: Negative.   Heel Compression Test: Negative.          Leg/Ankle/Foot - Nerve Irritation:  Sumit Sign: Negative.   Digital Nerve Stretch Test: Negative.   Tinel Sign: Negative.   Tourniquet Sign: Negative.          Feet/Foot:   Positive BILATERAL  Varus foot     Imaging and Diagnostics Review:  Plain radiographs were ordered and independently reviewed.    Assessment   No diagnosis found.      Plan   Treatment or Intervention:  May use PRICE therapy as needed.,   Possibility in future to start into Physical Therapy 1-2 times a week for 8-10 weeks with manual therapy as well as dry needling and IASTM,   Again stressed the importance of wearing shoes with good stability control to help with the biomechanics affecting the lower legs,   Again stressed the importance of wearing full foot insoles to help with the  biomechanics affecting the lower legs,   Recommendation over-the-counter calcium 500mg, 3 times a day with vitamin-D3 1940-8530+ units a day with food as well as a daily multivitamin,   Recommendation over-the-counter Move Free for joint health,   May take OTC Tylenol Extra Strength or OTC Tylenol Arthritis, taking one every 6-8 hours with food as needed for pain management.,   Patient advised regarding the risk and/or potential adverse reactions and/or side effects of any prescribed medications along with any over-the-counter medications or any supplements used. Patient advised to seek immediate medical care if any adverse reactions occur. The patient and/or patient(s) parent(s) verbalized their understanding.,   Discussed in detail with the patient to the level of their understanding the possibility in the future of regenerative injections versus corticosteroids injections,   Possibility in future of MRI of RIGHT FOOT to rule out tendon vs ligament vs tear vs fracture vs other, MSK to read.  Patient to suggested to continue wearing SHORT WALKING BOOT brace for their RIGHT FOOT injury/condition. However, may transition to wearing a supported shoe. Patient educated that outside of boot there is still an increase change of re-injury/fracture.    Diagnostic studies:  XR foot right 3+ views  Narrative: Interpreted By:  Susie Ansari,   STUDY:  XR FOOT RIGHT 3+ VIEWS;  8/12/2024 8:47 am      INDICATION:  Signs/Symptoms:RIGHT FOOT PAIN.      COMPARISON:  07/15/2024      ACCESSION NUMBER(S):  [id number]      ORDERING CLINICIAN:  BRITNEY CUI      FINDINGS:  Nondisplaced fracture of the base of the 5th meta tarsal is again  seen, without significant change in alignment of fracture fragments.  No new fracture or dislocation is noted.      Impression: Redemonstration of nondisplaced fracture of the base of the 5th  metatarsal, without significant change in alignment of fracture  fragments.          MACRO:  None       Signed by: Susie Ansari 8/13/2024 6:56 PM  Dictation workstation:   [id number]     Activity Instructions, Restrictions, and Accommodations:      Consultations/Referrals:  None.    Follow-up:  Follow up     NEDRA MCLAUGHLIN on 9/5/24 at 10:52 AM.     Jason Melo CNP

## 2024-09-06 ENCOUNTER — APPOINTMENT (OUTPATIENT)
Dept: SPORTS MEDICINE | Facility: CLINIC | Age: 44
End: 2024-09-06
Payer: COMMERCIAL

## 2024-09-06 DIAGNOSIS — S99.921D RIGHT FOOT INJURY, SUBSEQUENT ENCOUNTER: ICD-10-CM

## 2024-09-06 DIAGNOSIS — M79.671 RIGHT FOOT PAIN: ICD-10-CM

## 2024-09-06 DIAGNOSIS — S92.354D NONDISPLACED FRACTURE OF FIFTH METATARSAL BONE, RIGHT FOOT, SUBSEQUENT ENCOUNTER FOR FRACTURE WITH ROUTINE HEALING: ICD-10-CM

## 2024-09-09 ASSESSMENT — ENCOUNTER SYMPTOMS
ARTHRALGIAS: 1
RESPIRATORY NEGATIVE: 1
MYALGIAS: 1
CONSTITUTIONAL NEGATIVE: 1
CARDIOVASCULAR NEGATIVE: 1

## 2024-09-09 NOTE — PATIENT INSTRUCTIONS
May use PRICE therapy as needed.,   Possibility in future to start into Physical Therapy 1-2 times a week for 8-10 weeks with manual therapy as well as dry needling and IASTM,   Again stressed the importance of wearing shoes with good stability control to help with the biomechanics affecting the lower legs,   Again stressed the importance of wearing full foot insoles to help with the biomechanics affecting the lower legs,   Recommendation over-the-counter calcium 500mg, 3 times a day with vitamin-D3 6821-8452+ units a day with food as well as a daily multivitamin,   Recommendation over-the-counter Move Free for joint health,   May take OTC Tylenol Extra Strength or OTC Tylenol Arthritis, taking one every 6-8 hours with food as needed for pain management.,   Patient advised regarding the risk and/or potential adverse reactions and/or side effects of any prescribed medications along with any over-the-counter medications or any supplements used. Patient advised to seek immediate medical care if any adverse reactions occur. The patient and/or patient(s) parent(s) verbalized their understanding.,   Discussed in detail with the patient to the level of their understanding the possibility in the future of regenerative injections versus corticosteroids injections,   Possibility in future of MRI of RIGHT FOOT to rule out tendon vs ligament vs tear vs fracture vs other, MSK to read.  Patient to suggested to continue wearing supportive tennis shoes for their RIGHT FOOT injury/condition. However, may transition to wearing a supported shoe.   Staff to reach out and order bone stimulator.  Follow up 6 weeks

## 2024-09-09 NOTE — PROGRESS NOTES
Established patient  History Of Present Illness  Jaz Low is a 44 y.o. female presenting for her follow up rexray of her RIGHT foot. Since last visit in office, patient feels better. Rates current 0/10. She states that she is feeling great and not getting any aches pains or soreness throughout the day. She arrives wearing good supportive tennis shoes. She denies any pops snaps or cracks. She denies numbness tingling pins or needles.  We reviewed patient x-ray results in detail.  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  Patient has been treated for this condition for the last 16 weeks and continues to have nonunion as such we will order a bone stimulator.  Patient can follow-up in 6 weeks for laura-ray and reevaluation and we can adjust treatment options as indicated at that time.  Patient can continue to wear her supportive footwear as she denies any significant pain with walking or other activity.  Patient verbalizes understanding and agreement with plan of care.      Past Medical History  She has a past medical history of Anemia, Anxiety, Cancer (Multi), and Depression.    Surgical History  She has a past surgical history that includes CT guided percutaneous biopsy muscle (9/14/2021).     Social History  She reports that she has quit smoking. Her smoking use included cigarettes. She has been exposed to tobacco smoke. She has never used smokeless tobacco. She reports current alcohol use. She reports that she does not use drugs.    Family History  Family History   Problem Relation Name Age of Onset    Other (Diabetes) Mother      Hyperlipidemia Father       Allergies  Pollen extracts    Review of Systems  Review of Systems   Constitutional: Negative.    HENT: Negative.     Respiratory: Negative.     Cardiovascular: Negative.    Musculoskeletal:  Positive for arthralgias and myalgias.   All other systems reviewed and are negative.    Last Recorded Vitals  /64   Pulse 75    Ht 1.524 m (5')   Wt 85.3 kg (188 lb)   BMI 36.72 kg/m²      The  NIMO SERRANO, was present during today's visit and not limited to physical examination!     Examination:  Right  5th Metatarsal  Edema: Negative.  Ecchymosis/Bruising: Negative.   Percussion Test: Negative.         Tuning Fork Test: Negative.     Orientation:   Negative.         ROM:   Negative.            Muscle Strength:   +5/+5 Plantar Flexion,   +5/+5  Dorsi Flexion,         +5/+5 Inversion  +5/+5 Eversion        +5/+5 Plantarflexion in combination with inversion  +5/+5 Plantarflexion in combination with eversion          +5/+5 Dorsiflexion in combination with inversion (Posterior Tibialis)  +5/+5 Dorsiflexion in combination with eversion (Peroneal Brevis)  +5/+5 Dorsiflexion in combination with eversion and flexion of great toe (Peroneal Longus).             Palpation:   Negative.           Vascular:   +2/+4 Dorsalis Pedis  +2/+4 Posterior Tibialis  Capillary Refill < 2 Seconds.        Leg/Ankle/Foot - Ankle Impingement:  Posterior Ankle Impingement Test: Negative.   Anterior Ankle Impingement Test: Negative.          Leg/Ankle/Foot - Ankle Instability:  Flexibility Test:  Negative.  Anterior Drawer Test:  Negative.   Talar Tilt Test:  Negative.   External Rotation Kleiger Plantar Flexion Test:  Negative.   External Rotation Kleiger Dorsiflexion Test:  Negative.   Squeeze Test:  Negative.  Dorsiflexion Test:  Negative.   Posterior Tibial Instability Test: Negative.  Peroneal Tendon Instability Test:  Negative.  Horizontal Squeeze Test:  Negative.   Vertical Squeeze Test:  Negative.  Standing/Walking Test: Negative.         Leg/Ankle/Foot - DVT:  Blas's Sign: Negative.          Leg/Ankle/Foot - Forefoot:  Strunsky Test:  Negative.   Gaenslen Maneuver Test:  Negative.   Metatarsal Tap Test:  Negative.  Crepitation Test:  Negative.          Leg/Ankle/Foot - Hindfoot Achilles/Calcaneus:  Murillo Compression Test: Negative.   Lesley  Sign: Negative.   Achilles Tendon Tap Test: Negative.   Heel Compression Test: Negative.          Leg/Ankle/Foot - Nerve Irritation:  Sumit Sign: Negative.   Digital Nerve Stretch Test: Negative.   Tinel Sign: Negative.   Tourniquet Sign: Negative.          Feet/Foot:   Positive BILATERAL  Varus foot     Imaging and Diagnostics Review:  Plain radiographs were ordered and independently reviewed.  X-rays independently reviewed with continuation of nonunion of patient's previously established fracture  Assessment   1. 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 Right foot pain    3. Right foot injury, subsequent encounter          Plan   Treatment or Intervention:  May use PRICE therapy as needed.,   Possibility in future to start into Physical Therapy 1-2 times a week for 8-10 weeks with manual therapy as well as dry needling and IASTM,   Again stressed the importance of wearing shoes with good stability control to help with the biomechanics affecting the lower legs,   Again stressed the importance of wearing full foot insoles to help with the biomechanics affecting the lower legs,   Recommendation over-the-counter calcium 500mg, 3 times a day with vitamin-D3 2582-2254+ units a day with food as well as a daily multivitamin,   Recommendation over-the-counter Move Free for joint health,   May take OTC Tylenol Extra Strength or OTC Tylenol Arthritis, taking one every 6-8 hours with food as needed for pain management.,   Patient advised regarding the risk and/or potential adverse reactions and/or side effects of any prescribed medications along with any over-the-counter medications or any supplements used. Patient advised to seek immediate medical care if any adverse reactions occur. The patient and/or patient(s) parent(s) verbalized their understanding.,   Discussed in detail with the patient to the level of their understanding the possibility in the future of regenerative  injections versus corticosteroids injections,   Possibility in future of MRI of RIGHT FOOT to rule out tendon vs ligament vs tear vs fracture vs other, MSK to read.  Patient to suggested to continue wearing supportive tennis shoes for their RIGHT FOOT injury/condition. However, may transition to wearing a supported shoe.   Staff to reach out and order bone stimulator.    Diagnostic studies:  XR foot right 3+ views  Narrative: Interpreted By:  Susie Ansari,   STUDY:  XR FOOT RIGHT 3+ VIEWS;  8/12/2024 8:47 am      INDICATION:  Signs/Symptoms:RIGHT FOOT PAIN.      COMPARISON:  07/15/2024      ACCESSION NUMBER(S):  [id number]      ORDERING CLINICIAN:  BRITNEY CUI      FINDINGS:  Nondisplaced fracture of the base of the 5th meta tarsal is again  seen, without significant change in alignment of fracture fragments.  No new fracture or dislocation is noted.      Impression: Redemonstration of nondisplaced fracture of the base of the 5th  metatarsal, without significant change in alignment of fracture  fragments.          MACRO:  None      Signed by: Susie Ansari 8/13/2024 6:56 PM  Dictation workstation:   [id number]     Activity Instructions, Restrictions, and Accommodations:      Consultations/Referrals:  None.    Follow-up:  Follow up 6 weeks  Total appointment time _30_ minutes. Greater than 50% spent counseling patient on results of physical exam, treatment options as well as results of ordered imaging and treatment for results, need for PT and expected outcomes, as well as discussing possible medications.    BRITNEY CUI on 9/10/24 at 11:43 AM.     Britney Cui CNP

## 2024-09-10 ENCOUNTER — OFFICE VISIT (OUTPATIENT)
Dept: SPORTS MEDICINE | Facility: CLINIC | Age: 44
End: 2024-09-10
Payer: COMMERCIAL

## 2024-09-10 ENCOUNTER — HOSPITAL ENCOUNTER (OUTPATIENT)
Dept: RADIOLOGY | Facility: CLINIC | Age: 44
Discharge: HOME | End: 2024-09-10
Payer: COMMERCIAL

## 2024-09-10 VITALS
WEIGHT: 188 LBS | BODY MASS INDEX: 36.91 KG/M2 | HEIGHT: 60 IN | HEART RATE: 75 BPM | SYSTOLIC BLOOD PRESSURE: 118 MMHG | DIASTOLIC BLOOD PRESSURE: 64 MMHG

## 2024-09-10 DIAGNOSIS — S92.354D NONDISPLACED FRACTURE OF FIFTH METATARSAL BONE, RIGHT FOOT, SUBSEQUENT ENCOUNTER FOR FRACTURE WITH ROUTINE HEALING: ICD-10-CM

## 2024-09-10 DIAGNOSIS — M79.671 RIGHT FOOT PAIN: ICD-10-CM

## 2024-09-10 DIAGNOSIS — S99.921D RIGHT FOOT INJURY, SUBSEQUENT ENCOUNTER: ICD-10-CM

## 2024-09-10 DIAGNOSIS — S92.354K CLOSED NONDISPLACED FRACTURE OF FIFTH METATARSAL BONE OF RIGHT FOOT WITH NONUNION: Primary | ICD-10-CM

## 2024-09-10 PROCEDURE — 1036F TOBACCO NON-USER: CPT | Performed by: NURSE PRACTITIONER

## 2024-09-10 PROCEDURE — 3008F BODY MASS INDEX DOCD: CPT | Performed by: NURSE PRACTITIONER

## 2024-09-10 PROCEDURE — 73630 X-RAY EXAM OF FOOT: CPT | Mod: RIGHT SIDE | Performed by: RADIOLOGY

## 2024-09-10 PROCEDURE — 73630 X-RAY EXAM OF FOOT: CPT | Mod: RT

## 2024-09-10 PROCEDURE — 99214 OFFICE O/P EST MOD 30 MIN: CPT | Performed by: NURSE PRACTITIONER

## 2024-09-10 ASSESSMENT — ENCOUNTER SYMPTOMS
OCCASIONAL FEELINGS OF UNSTEADINESS: 0
LOSS OF SENSATION IN FEET: 0
DEPRESSION: 0

## 2024-09-10 ASSESSMENT — PAIN - FUNCTIONAL ASSESSMENT: PAIN_FUNCTIONAL_ASSESSMENT: NO/DENIES PAIN

## 2024-09-10 ASSESSMENT — COLUMBIA-SUICIDE SEVERITY RATING SCALE - C-SSRS
1. IN THE PAST MONTH, HAVE YOU WISHED YOU WERE DEAD OR WISHED YOU COULD GO TO SLEEP AND NOT WAKE UP?: NO
6. HAVE YOU EVER DONE ANYTHING, STARTED TO DO ANYTHING, OR PREPARED TO DO ANYTHING TO END YOUR LIFE?: NO
2. HAVE YOU ACTUALLY HAD ANY THOUGHTS OF KILLING YOURSELF?: NO

## 2024-09-10 ASSESSMENT — PAIN SCALES - GENERAL: PAINLEVEL: 0-NO PAIN

## 2024-09-30 ENCOUNTER — LAB (OUTPATIENT)
Dept: LAB | Facility: LAB | Age: 44
End: 2024-09-30
Payer: COMMERCIAL

## 2024-09-30 ENCOUNTER — APPOINTMENT (OUTPATIENT)
Dept: HEMATOLOGY/ONCOLOGY | Facility: CLINIC | Age: 44
End: 2024-09-30
Payer: COMMERCIAL

## 2024-09-30 DIAGNOSIS — R53.83 OTHER FATIGUE: ICD-10-CM

## 2024-09-30 DIAGNOSIS — R63.5 ABNORMAL WEIGHT GAIN: ICD-10-CM

## 2024-09-30 DIAGNOSIS — E88.810 METABOLIC SYNDROME: Primary | ICD-10-CM

## 2024-09-30 DIAGNOSIS — E66.01 MORBID (SEVERE) OBESITY DUE TO EXCESS CALORIES (MULTI): ICD-10-CM

## 2024-09-30 LAB
25(OH)D3 SERPL-MCNC: 55 NG/ML (ref 30–100)
ALBUMIN SERPL BCP-MCNC: 4.1 G/DL (ref 3.4–5)
ALP SERPL-CCNC: 48 U/L (ref 33–110)
ALT SERPL W P-5'-P-CCNC: 13 U/L (ref 7–45)
ANION GAP SERPL CALCULATED.3IONS-SCNC: 11 MMOL/L (ref 10–20)
AST SERPL W P-5'-P-CCNC: 18 U/L (ref 9–39)
BILIRUB SERPL-MCNC: 0.3 MG/DL (ref 0–1.2)
BUN SERPL-MCNC: 12 MG/DL (ref 6–23)
CALCIUM SERPL-MCNC: 9.2 MG/DL (ref 8.6–10.3)
CHLORIDE SERPL-SCNC: 107 MMOL/L (ref 98–107)
CHOLEST SERPL-MCNC: 204 MG/DL (ref 0–199)
CHOLEST/HDLC SERPL: 4.6 {RATIO}
CO2 SERPL-SCNC: 26 MMOL/L (ref 21–32)
CREAT SERPL-MCNC: 0.72 MG/DL (ref 0.5–1.05)
EGFRCR SERPLBLD CKD-EPI 2021: >90 ML/MIN/1.73M*2
ERYTHROCYTE [DISTWIDTH] IN BLOOD BY AUTOMATED COUNT: 13.6 % (ref 11.5–14.5)
EST. AVERAGE GLUCOSE BLD GHB EST-MCNC: 97 MG/DL
GLUCOSE SERPL-MCNC: 82 MG/DL (ref 74–99)
HBA1C MFR BLD: 5 %
HCT VFR BLD AUTO: 37 % (ref 36–46)
HDLC SERPL-MCNC: 44.2 MG/DL
HGB BLD-MCNC: 11.7 G/DL (ref 12–16)
INSULIN P FAST SERPL-ACNC: 9 UIU/ML (ref 3–25)
LDLC SERPL CALC-MCNC: 145 MG/DL
LIPASE SERPL-CCNC: 23 U/L (ref 9–82)
MCH RBC QN AUTO: 28.3 PG (ref 26–34)
MCHC RBC AUTO-ENTMCNC: 31.6 G/DL (ref 32–36)
MCV RBC AUTO: 90 FL (ref 80–100)
NON HDL CHOLESTEROL: 160 MG/DL (ref 0–149)
NRBC BLD-RTO: 0 /100 WBCS (ref 0–0)
PLATELET # BLD AUTO: 185 X10*3/UL (ref 150–450)
POTASSIUM SERPL-SCNC: 4.3 MMOL/L (ref 3.5–5.3)
PROT SERPL-MCNC: 7.2 G/DL (ref 6.4–8.2)
RBC # BLD AUTO: 4.13 X10*6/UL (ref 4–5.2)
SODIUM SERPL-SCNC: 140 MMOL/L (ref 136–145)
TRIGL SERPL-MCNC: 75 MG/DL (ref 0–149)
TSH SERPL-ACNC: 0.9 MIU/L (ref 0.44–3.98)
VIT B12 SERPL-MCNC: >2000 PG/ML (ref 211–911)
VLDL: 15 MG/DL (ref 0–40)
WBC # BLD AUTO: 5.5 X10*3/UL (ref 4.4–11.3)

## 2024-09-30 PROCEDURE — 80061 LIPID PANEL: CPT

## 2024-09-30 PROCEDURE — 83036 HEMOGLOBIN GLYCOSYLATED A1C: CPT

## 2024-09-30 PROCEDURE — 82306 VITAMIN D 25 HYDROXY: CPT

## 2024-09-30 PROCEDURE — 36415 COLL VENOUS BLD VENIPUNCTURE: CPT

## 2024-09-30 PROCEDURE — 82607 VITAMIN B-12: CPT

## 2024-09-30 PROCEDURE — 83690 ASSAY OF LIPASE: CPT

## 2024-09-30 PROCEDURE — 84443 ASSAY THYROID STIM HORMONE: CPT

## 2024-09-30 PROCEDURE — 85027 COMPLETE CBC AUTOMATED: CPT

## 2024-09-30 PROCEDURE — 83525 ASSAY OF INSULIN: CPT

## 2024-09-30 PROCEDURE — 80053 COMPREHEN METABOLIC PANEL: CPT

## 2024-10-08 ENCOUNTER — APPOINTMENT (OUTPATIENT)
Dept: HEMATOLOGY/ONCOLOGY | Facility: CLINIC | Age: 44
End: 2024-10-08
Payer: COMMERCIAL

## 2024-10-08 DIAGNOSIS — C81.05: Primary | ICD-10-CM

## 2024-10-23 ASSESSMENT — ENCOUNTER SYMPTOMS
MYALGIAS: 1
CONSTITUTIONAL NEGATIVE: 1
ARTHRALGIAS: 1
RESPIRATORY NEGATIVE: 1
CARDIOVASCULAR NEGATIVE: 1

## 2024-10-23 NOTE — PROGRESS NOTES
Established patient  History Of Present Illness  Jaz Low is a 44 y.o. female presenting for her follow up rexray of her RIGHT foot. Since last visit in office, patient feels better. Rates current 0/10. She states that she is feeling better. Notes that she has been using the bone stimulator for the past three weeks consistently.  We reviewed patient x-ray results in detail.  Patient appears to continue to have improved healing after using the bone stimulator.  Patient appears to be about 90% healed with no continued pain or disability.  As such patient can continue with use of bone stimulator and follow-up as needed for further complaints of pain or disability as indicated.  Patient verbalizes understanding and agreement with plan of care.    Past Medical History  She has a past medical history of Anemia, Anxiety, Cancer (Multi), and Depression.    Surgical History  She has a past surgical history that includes CT guided percutaneous biopsy muscle (9/14/2021).     Social History  She reports that she has quit smoking. Her smoking use included cigarettes. She has been exposed to tobacco smoke. She has never used smokeless tobacco. She reports current alcohol use. She reports that she does not use drugs.    Family History  Family History   Problem Relation Name Age of Onset    Other (Diabetes) Mother      Hyperlipidemia Father       Allergies  Pollen extracts    Review of Systems  Review of Systems   Constitutional: Negative.    HENT: Negative.     Respiratory: Negative.     Cardiovascular: Negative.    Musculoskeletal:  Positive for arthralgias and myalgias.   All other systems reviewed and are negative.    Last Recorded Vitals  There were no vitals taken for this visit.     The  NEDRA ABEBE, was present during today's visit and not limited to physical examination!     Examination:  Right  5th Metatarsal  Edema: Negative.  Ecchymosis/Bruising: Negative.   Percussion Test: Negative.         Tuning  Fork Test: Negative.     Orientation:   Negative.         ROM:   Negative.            Muscle Strength:   +5/+5 Plantar Flexion,   +5/+5  Dorsi Flexion,         +5/+5 Inversion  +5/+5 Eversion        +5/+5 Plantarflexion in combination with inversion  +5/+5 Plantarflexion in combination with eversion          +5/+5 Dorsiflexion in combination with inversion (Posterior Tibialis)  +5/+5 Dorsiflexion in combination with eversion (Peroneal Brevis)  +5/+5 Dorsiflexion in combination with eversion and flexion of great toe (Peroneal Longus).             Palpation:   Negative.           Vascular:   +2/+4 Dorsalis Pedis  +2/+4 Posterior Tibialis  Capillary Refill < 2 Seconds.        Leg/Ankle/Foot - Ankle Impingement:  Posterior Ankle Impingement Test: Negative.   Anterior Ankle Impingement Test: Negative.          Leg/Ankle/Foot - Ankle Instability:  Flexibility Test:  Negative.  Anterior Drawer Test:  Negative.   Talar Tilt Test:  Negative.   External Rotation Kleiger Plantar Flexion Test:  Negative.   External Rotation Kleiger Dorsiflexion Test:  Negative.   Squeeze Test:  Negative.  Dorsiflexion Test:  Negative.   Posterior Tibial Instability Test: Negative.  Peroneal Tendon Instability Test:  Negative.  Horizontal Squeeze Test:  Negative.   Vertical Squeeze Test:  Negative.  Standing/Walking Test: Negative.         Leg/Ankle/Foot - DVT:  Blas's Sign: Negative.          Leg/Ankle/Foot - Forefoot:  Strunsky Test:  Negative.   Gaenslen Maneuver Test:  Negative.   Metatarsal Tap Test:  Negative.  Crepitation Test:  Negative.          Leg/Ankle/Foot - Hindfoot Achilles/Calcaneus:  Murillo Compression Test: Negative.   Hoffa Sign: Negative.   Achilles Tendon Tap Test: Negative.   Heel Compression Test: Negative.          Leg/Ankle/Foot - Nerve Irritation:  Sumit Sign: Negative.   Digital Nerve Stretch Test: Negative.   Tinel Sign: Negative.   Tourniquet Sign: Negative.          Feet/Foot:   Positive BILATERAL  Varus foot      Imaging and Diagnostics Review:  Plain radiographs were ordered and independently reviewed.  X-rays independently reviewed with continuation of nonunion of patient's previously established fracture    Assessment   No diagnosis found.    Plan   Treatment or Intervention:  May use PRICE therapy as needed.,   Possibility in future to start into Physical Therapy 1-2 times a week for 8-10 weeks with manual therapy as well as dry needling and IASTM,   Again stressed the importance of wearing shoes with good stability control to help with the biomechanics affecting the lower legs,   Again stressed the importance of wearing full foot insoles to help with the biomechanics affecting the lower legs,   Recommendation over-the-counter calcium 500mg, 3 times a day with vitamin-D3 1835-1279+ units a day with food as well as a daily multivitamin,   Recommendation over-the-counter Move Free for joint health,   May take OTC Tylenol Extra Strength or OTC Tylenol Arthritis, taking one every 6-8 hours with food as needed for pain management.,   Patient advised regarding the risk and/or potential adverse reactions and/or side effects of any prescribed medications along with any over-the-counter medications or any supplements used. Patient advised to seek immediate medical care if any adverse reactions occur. The patient and/or patient(s) parent(s) verbalized their understanding.,   Discussed in detail with the patient to the level of their understanding the possibility in the future of regenerative injections versus corticosteroids injections,   Possibility in future of MRI of RIGHT FOOT to rule out tendon vs ligament vs tear vs fracture vs other, MSK to read.  Patient to suggested to continue wearing supportive tennis shoes for their RIGHT FOOT injury/condition. However, may transition to wearing a supported shoe.     Diagnostic studies:  XR foot right 3+ views  Narrative: Interpreted By:  Malcolm Bueno,   STUDY:  XR FOOT RIGHT  3+ VIEWS; ;  9/10/2024 10:06 am      INDICATION:  Signs/Symptoms:RIGHT FOOT PAIN.      ,M79.671 Pain in right foot,S99.921D Unspecified injury of right  foot, subsequent encounter,S92.354D Nondisplaced fracture of fifth  metatarsal bone, right foot, subsequent encounter for fracture with  routine healing      COMPARISON:  08/12/2024      ACCESSION NUMBER(S):  [id number]      ORDERING CLINICIAN:  BRITNEY MELO      FINDINGS:  Right foot, three views      Redemonstration of a nondisplaced fracture through the base of the  5th metatarsal. No interval callus formation seen. No osseous  bridging. No new abnormality seen. The joints are maintained      Impression: Subacute fracture through the base of the 5th metatarsal without  change from the prior.          MACRO:  None      Signed by: Malcolm Bueno 9/11/2024 11:06 PM  Dictation workstation:   [id number]     Activity Instructions, Restrictions, and Accommodations:      Consultations/Referrals:  None.    Follow-up:  Follow up as needed    NEDRA MCLAUGHLIN on 10/23/24 at 8:07 AM.     Britney Melo CNP

## 2024-10-23 NOTE — PATIENT INSTRUCTIONS
May use PRICE therapy as needed.,   Possibility in future to start into Physical Therapy 1-2 times a week for 8-10 weeks with manual therapy as well as dry needling and IASTM,   Again stressed the importance of wearing shoes with good stability control to help with the biomechanics affecting the lower legs,   Again stressed the importance of wearing full foot insoles to help with the biomechanics affecting the lower legs,   Recommendation over-the-counter calcium 500mg, 3 times a day with vitamin-D3 8092-9894+ units a day with food as well as a daily multivitamin,   Recommendation over-the-counter Move Free for joint health,   May take OTC Tylenol Extra Strength or OTC Tylenol Arthritis, taking one every 6-8 hours with food as needed for pain management.,   Patient advised regarding the risk and/or potential adverse reactions and/or side effects of any prescribed medications along with any over-the-counter medications or any supplements used. Patient advised to seek immediate medical care if any adverse reactions occur. The patient and/or patient(s) parent(s) verbalized their understanding.,   Discussed in detail with the patient to the level of their understanding the possibility in the future of regenerative injections versus corticosteroids injections,   Possibility in future of MRI of RIGHT FOOT to rule out tendon vs ligament vs tear vs fracture vs other, MSK to read.  Patient to suggested to continue wearing supportive tennis shoes for their RIGHT FOOT injury/condition. However, may transition to wearing a supported shoe.   Follow up as needed

## 2024-10-24 ENCOUNTER — HOSPITAL ENCOUNTER (OUTPATIENT)
Dept: RADIOLOGY | Facility: CLINIC | Age: 44
Discharge: HOME | End: 2024-10-24
Payer: COMMERCIAL

## 2024-10-24 ENCOUNTER — OFFICE VISIT (OUTPATIENT)
Dept: SPORTS MEDICINE | Facility: CLINIC | Age: 44
End: 2024-10-24
Payer: COMMERCIAL

## 2024-10-24 VITALS
DIASTOLIC BLOOD PRESSURE: 64 MMHG | WEIGHT: 188 LBS | HEART RATE: 71 BPM | BODY MASS INDEX: 36.91 KG/M2 | HEIGHT: 60 IN | SYSTOLIC BLOOD PRESSURE: 118 MMHG

## 2024-10-24 DIAGNOSIS — S92.354K CLOSED NONDISPLACED FRACTURE OF FIFTH METATARSAL BONE OF RIGHT FOOT WITH NONUNION: ICD-10-CM

## 2024-10-24 DIAGNOSIS — S92.354D NONDISPLACED FRACTURE OF FIFTH METATARSAL BONE, RIGHT FOOT, SUBSEQUENT ENCOUNTER FOR FRACTURE WITH ROUTINE HEALING: ICD-10-CM

## 2024-10-24 DIAGNOSIS — M79.671 RIGHT FOOT PAIN: ICD-10-CM

## 2024-10-24 DIAGNOSIS — S99.921D RIGHT FOOT INJURY, SUBSEQUENT ENCOUNTER: ICD-10-CM

## 2024-10-24 PROCEDURE — 1036F TOBACCO NON-USER: CPT | Performed by: NURSE PRACTITIONER

## 2024-10-24 PROCEDURE — 99214 OFFICE O/P EST MOD 30 MIN: CPT | Performed by: NURSE PRACTITIONER

## 2024-10-24 PROCEDURE — 3008F BODY MASS INDEX DOCD: CPT | Performed by: NURSE PRACTITIONER

## 2024-10-24 PROCEDURE — 73630 X-RAY EXAM OF FOOT: CPT | Mod: RT

## 2024-10-24 ASSESSMENT — ENCOUNTER SYMPTOMS
DEPRESSION: 0
LOSS OF SENSATION IN FEET: 0
OCCASIONAL FEELINGS OF UNSTEADINESS: 0

## 2024-10-24 ASSESSMENT — PATIENT HEALTH QUESTIONNAIRE - PHQ9
1. LITTLE INTEREST OR PLEASURE IN DOING THINGS: NOT AT ALL
2. FEELING DOWN, DEPRESSED OR HOPELESS: NOT AT ALL
SUM OF ALL RESPONSES TO PHQ9 QUESTIONS 1 AND 2: 0

## 2024-10-24 ASSESSMENT — PAIN SCALES - GENERAL: PAINLEVEL_OUTOF10: 0-NO PAIN

## 2024-10-31 ENCOUNTER — TELEPHONE (OUTPATIENT)
Dept: HEMATOLOGY/ONCOLOGY | Facility: CLINIC | Age: 44
End: 2024-10-31
Payer: COMMERCIAL

## 2024-11-04 ENCOUNTER — LAB (OUTPATIENT)
Dept: LAB | Facility: CLINIC | Age: 44
End: 2024-11-04
Payer: COMMERCIAL

## 2024-11-04 DIAGNOSIS — C83.30 DIFFUSE LARGE B-CELL LYMPHOMA, UNSPECIFIED BODY REGION (MULTI): ICD-10-CM

## 2024-11-04 DIAGNOSIS — C81.05: ICD-10-CM

## 2024-11-04 LAB
ALBUMIN SERPL BCP-MCNC: 4.1 G/DL (ref 3.4–5)
ALP SERPL-CCNC: 61 U/L (ref 33–110)
ALT SERPL W P-5'-P-CCNC: 18 U/L (ref 7–45)
ANION GAP SERPL CALC-SCNC: 12 MMOL/L (ref 10–20)
AST SERPL W P-5'-P-CCNC: 22 U/L (ref 9–39)
BASOPHILS # BLD AUTO: 0.02 X10*3/UL (ref 0–0.1)
BASOPHILS NFR BLD AUTO: 0.3 %
BILIRUB SERPL-MCNC: 0.2 MG/DL (ref 0–1.2)
BUN SERPL-MCNC: 17 MG/DL (ref 6–23)
CALCIUM SERPL-MCNC: 9.5 MG/DL (ref 8.6–10.3)
CHLORIDE SERPL-SCNC: 103 MMOL/L (ref 98–107)
CO2 SERPL-SCNC: 28 MMOL/L (ref 21–32)
CREAT SERPL-MCNC: 0.71 MG/DL (ref 0.5–1.05)
EGFRCR SERPLBLD CKD-EPI 2021: >90 ML/MIN/1.73M*2
EOSINOPHIL # BLD AUTO: 0.04 X10*3/UL (ref 0–0.7)
EOSINOPHIL NFR BLD AUTO: 0.6 %
ERYTHROCYTE [DISTWIDTH] IN BLOOD BY AUTOMATED COUNT: 13.4 % (ref 11.5–14.5)
FERRITIN SERPL-MCNC: 91 NG/ML (ref 8–150)
FOLATE SERPL-MCNC: >24 NG/ML
GLUCOSE SERPL-MCNC: 82 MG/DL (ref 74–99)
HCT VFR BLD AUTO: 37.2 % (ref 36–46)
HGB BLD-MCNC: 12 G/DL (ref 12–16)
HGB RETIC QN: 33 PG (ref 28–38)
IMM GRANULOCYTES # BLD AUTO: 0.01 X10*3/UL (ref 0–0.7)
IMM GRANULOCYTES NFR BLD AUTO: 0.1 % (ref 0–0.9)
IMMATURE RETIC FRACTION: 10.7 %
IRON SATN MFR SERPL: 15 % (ref 25–45)
IRON SERPL-MCNC: 48 UG/DL (ref 35–150)
LDH SERPL L TO P-CCNC: 203 U/L (ref 84–246)
LYMPHOCYTES # BLD AUTO: 2.21 X10*3/UL (ref 1.2–4.8)
LYMPHOCYTES NFR BLD AUTO: 31.6 %
MCH RBC QN AUTO: 28.8 PG (ref 26–34)
MCHC RBC AUTO-ENTMCNC: 32.3 G/DL (ref 32–36)
MCV RBC AUTO: 89 FL (ref 80–100)
MONOCYTES # BLD AUTO: 0.29 X10*3/UL (ref 0.1–1)
MONOCYTES NFR BLD AUTO: 4.1 %
NEUTROPHILS # BLD AUTO: 4.42 X10*3/UL (ref 1.2–7.7)
NEUTROPHILS NFR BLD AUTO: 63.3 %
NRBC BLD-RTO: 0 /100 WBCS (ref 0–0)
PLATELET # BLD AUTO: 209 X10*3/UL (ref 150–450)
POTASSIUM SERPL-SCNC: 3.7 MMOL/L (ref 3.5–5.3)
PROT SERPL-MCNC: 7.6 G/DL (ref 6.4–8.2)
RBC # BLD AUTO: 4.16 X10*6/UL (ref 4–5.2)
RETICS #: 0.04 X10*6/UL (ref 0.02–0.08)
RETICS/RBC NFR AUTO: 1.1 % (ref 0.5–2)
SODIUM SERPL-SCNC: 139 MMOL/L (ref 136–145)
TIBC SERPL-MCNC: 324 UG/DL (ref 240–445)
UIBC SERPL-MCNC: 276 UG/DL (ref 110–370)
VIT B12 SERPL-MCNC: >2000 PG/ML (ref 211–911)
WBC # BLD AUTO: 7 X10*3/UL (ref 4.4–11.3)

## 2024-11-04 PROCEDURE — 85045 AUTOMATED RETICULOCYTE COUNT: CPT

## 2024-11-04 PROCEDURE — 82607 VITAMIN B-12: CPT

## 2024-11-04 PROCEDURE — 82746 ASSAY OF FOLIC ACID SERUM: CPT

## 2024-11-04 PROCEDURE — 82728 ASSAY OF FERRITIN: CPT

## 2024-11-04 PROCEDURE — 80053 COMPREHEN METABOLIC PANEL: CPT

## 2024-11-04 PROCEDURE — 36415 COLL VENOUS BLD VENIPUNCTURE: CPT

## 2024-11-04 PROCEDURE — 85025 COMPLETE CBC W/AUTO DIFF WBC: CPT

## 2024-11-04 PROCEDURE — 83615 LACTATE (LD) (LDH) ENZYME: CPT

## 2024-11-04 PROCEDURE — 83540 ASSAY OF IRON: CPT

## 2024-11-07 ENCOUNTER — OFFICE VISIT (OUTPATIENT)
Dept: HEMATOLOGY/ONCOLOGY | Facility: CLINIC | Age: 44
End: 2024-11-07
Payer: COMMERCIAL

## 2024-11-07 VITALS
RESPIRATION RATE: 16 BRPM | BODY MASS INDEX: 32.16 KG/M2 | HEART RATE: 85 BPM | WEIGHT: 164.68 LBS | DIASTOLIC BLOOD PRESSURE: 85 MMHG | TEMPERATURE: 97 F | OXYGEN SATURATION: 98 % | SYSTOLIC BLOOD PRESSURE: 114 MMHG

## 2024-11-07 DIAGNOSIS — C83.30 DIFFUSE LARGE B-CELL LYMPHOMA, UNSPECIFIED BODY REGION (MULTI): Primary | ICD-10-CM

## 2024-11-07 PROCEDURE — 99215 OFFICE O/P EST HI 40 MIN: CPT | Performed by: INTERNAL MEDICINE

## 2024-11-07 RX ORDER — FERROUS SULFATE 325(65) MG
325 TABLET ORAL 3 TIMES WEEKLY
Qty: 90 TABLET | Refills: 0 | Status: SHIPPED | OUTPATIENT
Start: 2024-11-08 | End: 2025-06-06

## 2024-11-07 ASSESSMENT — COLUMBIA-SUICIDE SEVERITY RATING SCALE - C-SSRS
6. HAVE YOU EVER DONE ANYTHING, STARTED TO DO ANYTHING, OR PREPARED TO DO ANYTHING TO END YOUR LIFE?: NO
1. IN THE PAST MONTH, HAVE YOU WISHED YOU WERE DEAD OR WISHED YOU COULD GO TO SLEEP AND NOT WAKE UP?: NO
2. HAVE YOU ACTUALLY HAD ANY THOUGHTS OF KILLING YOURSELF?: NO

## 2024-11-07 ASSESSMENT — PATIENT HEALTH QUESTIONNAIRE - PHQ9
1. LITTLE INTEREST OR PLEASURE IN DOING THINGS: NOT AT ALL
SUM OF ALL RESPONSES TO PHQ9 QUESTIONS 1 AND 2: 0
2. FEELING DOWN, DEPRESSED OR HOPELESS: NOT AT ALL

## 2024-11-07 ASSESSMENT — PAIN SCALES - GENERAL: PAINLEVEL_OUTOF10: 0-NO PAIN

## 2024-11-07 NOTE — PROGRESS NOTES
Patient ID: Jaz Low is a 44 y.o. female.  Referring Physician: No referring provider defined for this encounter.  Primary Care Provider: MARYJANE Pascual-CNP  Referral Reason: Large B Cell Lymphoma dx 9/2021     Subjective:  Patient returns today for routine follow-up evaluation for history of stage IV diffuse large B-cell lymphoma. Status post R-CHOP x6. Remission by PET scan February 16, 2022.   On presentation today she is feeling well. She denies B symptoms of fever, drenching night sweats, or weight loss.  No lymphadenopathy.    Heme/Onc History:  June 2021, developed left lower pelvis, hip pain   (Status post muscle relaxants, steroids and a chiropractor visit)  July 9, 2021 x-ray of hip negative   July 14, 2021 CT pelvis negative   Progressive pain requiring tramadol and then Percocet   August 31, 2021 MRI left hip, destructive lesion in the acetabulum with fracture  September 30, 2021 PET scan:diffuse hypermetabolic activity of the spleen, with a highly hypermetabolic intrasplenic mass consistent with diffuse large B-cell lymphoma.  There is also hypermetabolic perisplenic and retroperitoneal lymph nodes and hypermetabolic  lytic lesion of the left iliac bone which involves the acetabulum.  This would be considered stage IV disease   Port-A-Cath placed   Echocardiogram: EF: 60 to 65%   October 15, 2021 R-CHOP cycle 1   After cycle 2 admitted to the hospital with neutropenic fever and COVID-positive   November 17, 2021 discharged   January 28, 2022 received cycle 6 CHOP  December 10, 2021 MRI hip: Partially improved   February 16, 2022, PET scan: PET negative   August 10, 2022: PET scan: No recurrent disease   July 2022: Echo: EF 55 to 60%   August 2022, Port-A-Cath removed    CT guided biopsy of left supra-acetabular 9/14/2021.    Pathology: Left supra-acetabular lesion: Atypical lymphoid proliferation most consistent with large B-cell lymphoma   Histologic sections showed fragments of skeletal  muscle and atypical proliferation consisting of large lymphoid cells with scant to moderate cytoplasm, hyperchromatic nuclei and prominent nucleoli admixed with scattered small lymphocytes, histiocytes and plasma cells in a necrotic background. There is an extensive crush artifact limiting the morphologic evaluation.               Cell of origin by Umberto criteria: Non-germinal center(CD10-, BCL6 minus, mum 1+)   C-MYC/BCL2: Not a double expressor (C-MYC-, BCL2-).                AE1/AE3 immunostain is negative.                                                            CD45 immunostain highlights lymphoid cells.                                                       CD20 and PAX5 immunostains highlights increased number of neoplastic B-cells.                                                 CD3 and CD5 immunostains highlights background T-cells.                                                 CD10 and BCL6 immunostains are negative in neoplastic B-cells.                                                     MUM1 immunostain highlights neoplastic B-cells (30-40%).                                                C-MYC immunostain is negative in neoplastic B-cells (20-30%).                                                       BCL-2 immunostain highlights T-cells.                                                    CD68 and  immunostains highlights scattered histiocytes.                                                      Ki67 immunostain shows high proliferation index (60-70%).                                               CD30 immunostain is negative.                               CYTOGENETIC/MOLECULAR STUDIES: B-cell rearrangement studies; FISH studies for BCL2, BCL6 and C-MYC  A.  LEFT SUPRA ACETABULAR LESION:                                                  --   CD21 and CD23 immunostains showed absence of follicular dendritic meshworks.                                                  --   CyclinD1 immunostain is  "negative in neoplastic B-cells.          B-Cell Gene Rearrangement-Positive  MYC and IGH/MYC Rearrangements NOT DETECTED                                                              GENETIC: FISH for MYC, BCL2, and BCL6 gene rearrangements to formally evaluate for a \"double/triple hit lymphoma\" are pending at the time of this report.                        Past Medical History:   Past Medical History:   Diagnosis Date    Anemia     Anxiety     Cancer (Multi)     Depression      Social History:   Social History     Socioeconomic History    Marital status:      Spouse name: Not on file    Number of children: Not on file    Years of education: Not on file    Highest education level: Not on file   Occupational History    Not on file   Tobacco Use    Smoking status: Former     Types: Cigarettes     Passive exposure: Past    Smokeless tobacco: Never   Vaping Use    Vaping status: Never Used   Substance and Sexual Activity    Alcohol use: Yes    Drug use: Never    Sexual activity: Not on file   Other Topics Concern    Not on file   Social History Narrative    Not on file     Social Drivers of Health     Financial Resource Strain: Not on file   Food Insecurity: Not on file   Transportation Needs: Not on file   Physical Activity: Not on file   Stress: Not on file   Social Connections: Not on file   Intimate Partner Violence: Not on file   Housing Stability: Not on file     Surgical History:   Past Surgical History:   Procedure Laterality Date    CT GUIDED PERCUTANEOUS BIOPSY MUSCLE  9/14/2021    CT GUIDED PERCUTANEOUS BIOPSY MUSCLE 9/14/2021 [facility]     Family History:   Family History   Problem Relation Name Age of Onset    Other (Diabetes) Mother      Hyperlipidemia Father        reports that she has quit smoking. Her smoking use included cigarettes. She has been exposed to tobacco smoke. She has never used smokeless tobacco.  Oncology Family history: Cancer-related family history is not on file.    Review Of " "Systems:  As stated per in HPI; otherwise all other 12 point ROS are negative    Physical Exam:  Wt 74.7 kg (164 lb 10.9 oz)   BMI 32.16 kg/m²   BSA: 1.78 meters squared  General: awake/alert/oriented x3, no distress, alert and cooperative  Head: Short hair fully covering scalp. Symmetric facial expressions  Eyes: PERRL, EOMI, clear sclera, eyebrows present.  Ears/Nose/Mouth/Throat:  Oral mucous membranes moist. No oral ulcers. No palpable pre/post-auricular lymph nodes  Neck: No palpable cervical chain lymph nodes  Respiratory: unlabored breathing on room air, good chest expansion, thorax symmetric  Cardio: Regular rate and rhythm, normal S1 and S2, radial pulses symmetric  GI: Nondistended, soft, non-tender abdomen  Musculoskeletal: Normal muscle bulk and tone, ROM intact, no joint swelling.  Rises from chair and walks unassisted.  Extremities: No ankle swelling, no arm or leg wounds  Neuro: Alert, cognition intact, speech normal. Facial expressions symmetric.  No motor deficits noted. Sensation intact to touch and hot/cold.   Able to stand from seated position unassisted and walks around the room unassisted.  Psychological: Appropriate mood and behavior.  Skin: Warm and dry, no lesions, no rashes    Results:  Diagnostic Results   Lab Results   Component Value Date    WBC 7.0 11/04/2024    HGB 12.0 11/04/2024    HCT 37.2 11/04/2024    MCV 89 11/04/2024     11/04/2024     Lab Results   Component Value Date    CALCIUM 9.5 11/04/2024     11/04/2024    K 3.7 11/04/2024    CO2 28 11/04/2024     11/04/2024    BUN 17 11/04/2024    CREATININE 0.71 11/04/2024    ALT 18 11/04/2024    AST 22 11/04/2024       Current Outpatient Medications:     DULoxetine (Cymbalta) 20 mg DR capsule, Take 1 capsule (20 mg) by mouth 2 times a day., Disp: , Rfl:     miscellaneous medical supply [facility], 1 Device once daily. Please dispense 1 bone stimulator, Disp: 1 each, Rfl: 0    propranolol (Inderal) 10 mg tablet, Prn \"panic " "attacks\", Disp: , Rfl:     semaglutide (Ozempic) 0.25 mg or 0.5 mg(2 mg/1.5 mL) pen injector, Inject 0.5 mg under the skin 1 (one) time per week., Disp: , Rfl:      Radiology:    Pathology:    Assessment/Plan:  ? DLBCL:    History of stage IV diffuse large B-cell lymphoma originating in the left acetabulum. Diagnosed September 14, 2021. PET scan with diffuse hypermetabolic activity of the spleen, with a highly hypermetabolic intrasplenic mass consistent with diffuse large B-cell lymphoma.  There is also hypermetabolic perisplenic and retroperitoneal lymph nodes and hypermetabolic  lytic lesion of the left iliac bone which involves the acetabulum. Now status post R-CHOP x6, remission by PET scan February 16, 2022.      Doing well no B symptoms or concerns about recurrent disease at this time.     No palpable Lns on exam. Nno need for imaging unless clinically indicated per NCCN guidelines. RTC in 6 months with labs    2- Iron def: low iron sat. Po iron 3 times per wek ordered for 6 months    3- Vit B12 overdose: advised to stop taking it    4- Fhx of cancer: grandmother with melanoma and colon cancer at age 52, and mother dx with breast cancer at age 50. Ambry is negative.  Telemed in 4 weeks to discuss AMbry  Diagnoses and all orders for this visit:  Diffuse large B-cell lymphoma, unspecified body region (Multi)  -     Lactate Dehydrogenase; Future  -     Comprehensive Metabolic Panel; Future  -     Folate; Future  -     CBC and Auto Differential; Future  -     Ferritin; Future  -     Iron and TIBC; Future  -     Vitamin B12; Future  -     Reticulocytes; Future       Performance Status: Asymptomatic    I spent more than 60 minutes for the patient today, including face-to-face conversation, pre-visit preparation, post-visit orders, and others.   Mark Baca MD                         "

## 2024-11-07 NOTE — PROGRESS NOTES
Pt seen in office today for a follow up visit with Dr. Mark Baca for management of her lymphoma.  She is without complaints today and denies pain.     Medications, pharmacy preference and allergies were reviewed with patient and updated in the medical record by MD.     Per orders, labs were obtained on 11/4/24 and results are to be reviewed in office today by MD with patient. She is to have an Ambry test obtained in office and will have a tele health visit in 1 months to review results. She is to return to clinic in 6 months for an in person visit.    Our contact information was given to patient and they were encouraged to contact us with any questions or concerns.     Patient verbalized understanding and agreement regarding discussed information via verbal feedback. Pt escorted to scheduling.

## 2024-11-08 ENCOUNTER — HOSPITAL ENCOUNTER (OUTPATIENT)
Dept: RADIOLOGY | Facility: HOSPITAL | Age: 44
Discharge: HOME | End: 2024-11-08
Payer: COMMERCIAL

## 2024-11-08 VITALS — HEIGHT: 60 IN | WEIGHT: 161 LBS | BODY MASS INDEX: 31.61 KG/M2

## 2024-11-08 DIAGNOSIS — Z12.31 ENCOUNTER FOR SCREENING MAMMOGRAM FOR BREAST CANCER: ICD-10-CM

## 2024-11-08 PROCEDURE — 77067 SCR MAMMO BI INCL CAD: CPT

## 2024-11-18 ENCOUNTER — OFFICE VISIT (OUTPATIENT)
Dept: PRIMARY CARE | Facility: CLINIC | Age: 44
End: 2024-11-18
Payer: COMMERCIAL

## 2024-11-18 VITALS
BODY MASS INDEX: 32.2 KG/M2 | HEIGHT: 60 IN | SYSTOLIC BLOOD PRESSURE: 122 MMHG | WEIGHT: 164 LBS | DIASTOLIC BLOOD PRESSURE: 76 MMHG | OXYGEN SATURATION: 98 % | HEART RATE: 83 BPM | TEMPERATURE: 96.6 F

## 2024-11-18 DIAGNOSIS — C83.30 DIFFUSE LARGE B-CELL LYMPHOMA, UNSPECIFIED BODY REGION (MULTI): ICD-10-CM

## 2024-11-18 DIAGNOSIS — Z23 ENCOUNTER FOR IMMUNIZATION: ICD-10-CM

## 2024-11-18 DIAGNOSIS — Z80.0 FAMILY HISTORY OF COLON CANCER: ICD-10-CM

## 2024-11-18 DIAGNOSIS — Z00.00 ANNUAL PHYSICAL EXAM: Primary | ICD-10-CM

## 2024-11-18 PROBLEM — E78.2 MIXED HYPERLIPIDEMIA: Status: ACTIVE | Noted: 2024-11-18

## 2024-11-18 RX ORDER — MULTIVITAMIN/IRON/FOLIC ACID 18MG-0.4MG
1 TABLET ORAL DAILY
COMMUNITY

## 2024-11-18 ASSESSMENT — LIFESTYLE VARIABLES
HOW MANY STANDARD DRINKS CONTAINING ALCOHOL DO YOU HAVE ON A TYPICAL DAY: PATIENT DOES NOT DRINK
HOW OFTEN DO YOU HAVE SIX OR MORE DRINKS ON ONE OCCASION: NEVER
HAS A RELATIVE, FRIEND, DOCTOR, OR ANOTHER HEALTH PROFESSIONAL EXPRESSED CONCERN ABOUT YOUR DRINKING OR SUGGESTED YOU CUT DOWN: NO
HOW OFTEN DURING THE LAST YEAR HAVE YOU NEEDED AN ALCOHOLIC DRINK FIRST THING IN THE MORNING TO GET YOURSELF GOING AFTER A NIGHT OF HEAVY DRINKING: NEVER
AUDIT-C TOTAL SCORE: 0
HOW OFTEN DO YOU HAVE A DRINK CONTAINING ALCOHOL: NEVER
HOW OFTEN DURING THE LAST YEAR HAVE YOU FAILED TO DO WHAT WAS NORMALLY EXPECTED FROM YOU BECAUSE OF DRINKING: NEVER
SKIP TO QUESTIONS 9-10: 1
AUDIT TOTAL SCORE: 0
HOW OFTEN DURING THE LAST YEAR HAVE YOU FOUND THAT YOU WERE NOT ABLE TO STOP DRINKING ONCE YOU HAD STARTED: NEVER
HOW OFTEN DURING THE LAST YEAR HAVE YOU BEEN UNABLE TO REMEMBER WHAT HAPPENED THE NIGHT BEFORE BECAUSE YOU HAD BEEN DRINKING: NEVER
HAVE YOU OR SOMEONE ELSE BEEN INJURED AS A RESULT OF YOUR DRINKING: NO
HOW OFTEN DURING THE LAST YEAR HAVE YOU HAD A FEELING OF GUILT OR REMORSE AFTER DRINKING: NEVER

## 2024-11-18 ASSESSMENT — ENCOUNTER SYMPTOMS
BLOOD IN STOOL: 0
DEPRESSION: 0
FEVER: 0
AGITATION: 0
FATIGUE: 0
VOMITING: 0
BRUISES/BLEEDS EASILY: 0
NECK PAIN: 0
SEIZURES: 0
BACK PAIN: 0
CONFUSION: 0
PALPITATIONS: 0
SHORTNESS OF BREATH: 0
COUGH: 0
FLANK PAIN: 0
WOUND: 0
HEMATURIA: 0
ABDOMINAL PAIN: 0
POLYPHAGIA: 0
OCCASIONAL FEELINGS OF UNSTEADINESS: 0
DIAPHORESIS: 0
DIZZINESS: 0
CHILLS: 0
ADENOPATHY: 0
DYSURIA: 0
POLYDIPSIA: 0
LOSS OF SENSATION IN FEET: 0
HEADACHES: 0
WHEEZING: 0
SPEECH DIFFICULTY: 0
NAUSEA: 0
FACIAL ASYMMETRY: 0

## 2024-11-18 ASSESSMENT — PAIN SCALES - GENERAL: PAINLEVEL_OUTOF10: 0-NO PAIN

## 2024-11-18 NOTE — PROGRESS NOTES
[facility]: [place] INTERNAL MEDICINE  PHYSICAL EXAM      Jaz Low is a 44 y.o. female that is presenting today for Annual Physical Exam. Lipid panel dated 09/30/24 reviewed with patient.     Advised annual influenza immunization today. Agreed.    She reports she was seen by Dermatology, Dr. Chad Medina, last week for skin test. FMHX melanoma. She had a bx and waiting for results.    Ms. Low is followed by Hem/Onc, Dr. Baca, for lymphoma.  Last seen 11/07/24.  Labs completed 11/04/24 including CBC, CMP and iron levels.    Recent fx right foot metatarsal, followed by sports medicine, Jason Melo, CNP.  Last seen 10/24/24.    She was seen by GYN, Dr. Monica Ramirez, for annual preventative care.    She goes to [employer] in [place] and is currently on tirzepitide. She is exercising. Goal weight is 125-130#.  She has lost 30# since 06/24/24.    Assessment/Plan   Diagnoses and all orders for this visit:    Annual physical exam    Diffuse large B-cell lymphoma, unspecified body region (Multi)        -     continue established follow up with Heme/Onc, Dr. Baca        -     cymbalta 40 mg nightly        -     ferrous sulfate 325 mg three times weekly        -     b complex 0.4 mg daily    Encounter for immunization  -     Flu vaccine, trivalent, preservative free, age 6 months and greater (Fluarix/Fluzone/Flulaval)    Family history of colon cancer  -     Referral to Gastroenterology; Future - Dr. Barros    Other orders  -     Follow Up In Primary Care - Health Maintenance  -     Follow Up In Primary Care - Health Maintenance; Future    Subjective   HPI  Review of Systems   Constitutional:  Negative for chills, diaphoresis, fatigue and fever.   HENT:  Negative for hearing loss and mouth sores.    Eyes:  Negative for visual disturbance.   Respiratory:  Negative for cough, shortness of breath and wheezing.    Cardiovascular:  Negative for chest pain, palpitations and leg swelling.    Gastrointestinal:  Negative for abdominal pain, blood in stool, nausea and vomiting.   Endocrine: Negative for cold intolerance, heat intolerance, polydipsia, polyphagia and polyuria.   Genitourinary:  Negative for dysuria, flank pain and hematuria.   Musculoskeletal:  Negative for back pain and neck pain.   Skin:  Negative for rash and wound.   Allergic/Immunologic: Positive for environmental allergies. Negative for food allergies and immunocompromised state.   Neurological:  Negative for dizziness, seizures, syncope, facial asymmetry, speech difficulty and headaches.   Hematological:  Negative for adenopathy. Does not bruise/bleed easily.   Psychiatric/Behavioral:  Negative for agitation and confusion.       Objective   Vitals:    11/18/24 1101   BP: 122/76   Pulse: 83   Temp: 35.9 °C (96.6 °F)   SpO2: 98%     Body mass index is 32.03 kg/m².  Physical Exam  Vitals and nursing note reviewed.   Constitutional:       General: She is not in acute distress.     Appearance: Normal appearance. She is not ill-appearing.   HENT:      Head: Normocephalic and atraumatic.      Right Ear: Tympanic membrane, ear canal and external ear normal. There is no impacted cerumen.      Left Ear: Tympanic membrane, ear canal and external ear normal. There is no impacted cerumen.      Nose: Nose normal.      Mouth/Throat:      Mouth: Mucous membranes are moist.      Pharynx: Oropharynx is clear. No oropharyngeal exudate or posterior oropharyngeal erythema.   Eyes:      General: No scleral icterus.        Right eye: No discharge.         Left eye: No discharge.      Extraocular Movements: Extraocular movements intact.      Conjunctiva/sclera: Conjunctivae normal.      Pupils: Pupils are equal, round, and reactive to light.   Cardiovascular:      Rate and Rhythm: Normal rate and regular rhythm.      Pulses: Normal pulses.      Heart sounds: Normal heart sounds. No murmur heard.  Pulmonary:      Effort: Pulmonary effort is normal. No  "respiratory distress.      Breath sounds: Normal breath sounds.   Abdominal:      General: Abdomen is flat. Bowel sounds are normal. There is no distension.      Palpations: Abdomen is soft. There is no mass.      Tenderness: There is no abdominal tenderness. There is no right CVA tenderness or left CVA tenderness.      Hernia: No hernia is present.   Musculoskeletal:         General: No tenderness. Normal range of motion.      Cervical back: No tenderness.      Right lower leg: No edema.      Left lower leg: No edema.   Lymphadenopathy:      Cervical: No cervical adenopathy.   Skin:     General: Skin is warm and dry.      Coloration: Skin is not jaundiced.      Findings: No rash.   Neurological:      General: No focal deficit present.      Mental Status: She is alert and oriented to person, place, and time. Mental status is at baseline.   Psychiatric:         Mood and Affect: Mood normal.         Behavior: Behavior normal.       Diagnostic Results   Lab Results   Component Value Date    GLUCOSE 82 11/04/2024    CALCIUM 9.5 11/04/2024     11/04/2024    K 3.7 11/04/2024    CO2 28 11/04/2024     11/04/2024    BUN 17 11/04/2024    CREATININE 0.71 11/04/2024     Lab Results   Component Value Date    ALT 18 11/04/2024    AST 22 11/04/2024    ALKPHOS 61 11/04/2024    BILITOT 0.2 11/04/2024     Lab Results   Component Value Date    WBC 7.0 11/04/2024    HGB 12.0 11/04/2024    HCT 37.2 11/04/2024    MCV 89 11/04/2024     11/04/2024     Lab Results   Component Value Date    CHOL 204 (H) 09/30/2024    CHOL 245 (H) 03/29/2024    CHOL 245 (H) 11/16/2022     Lab Results   Component Value Date    HDL 44.2 09/30/2024    HDL 54.3 03/29/2024    HDL 53.0 11/16/2022     Lab Results   Component Value Date    LDLCALC 145 (H) 09/30/2024    LDLCALC 176 (H) 03/29/2024     Lab Results   Component Value Date    TRIG 75 09/30/2024    TRIG 73 03/29/2024    TRIG 144 11/16/2022     No components found for: \"CHOLHDL\"  Lab " Results   Component Value Date    HGBA1C 5.0 09/30/2024     Other labs not included in the list above were reviewed either before or during this encounter.    History   Past Medical History:   Diagnosis Date    Anemia     Anxiety     Cancer (Multi)     Depression     Hx antineoplastic chemo 10/15/2021     Past Surgical History:   Procedure Laterality Date    CT GUIDED PERCUTANEOUS BIOPSY MUSCLE  9/14/2021    CT GUIDED PERCUTANEOUS BIOPSY MUSCLE 9/14/2021 [facility]     Family History   Problem Relation Name Age of Onset    Other (Diabetes) Mother Marti Munoz     Breast cancer Mother Marti Munoz 63    Hyperlipidemia Father      Colon cancer Maternal Grandmother Jacey Daniels      Social History     Socioeconomic History    Marital status:      Spouse name: Not on file    Number of children: Not on file    Years of education: Not on file    Highest education level: Not on file   Occupational History    Not on file   Tobacco Use    Smoking status: Former     Types: Cigarettes     Passive exposure: Past    Smokeless tobacco: Never   Vaping Use    Vaping status: Never Used   Substance and Sexual Activity    Alcohol use: Not Currently    Drug use: Never    Sexual activity: Not on file   Other Topics Concern    Not on file   Social History Narrative    Not on file     Social Drivers of Health     Financial Resource Strain: Not on file   Food Insecurity: Not on file   Transportation Needs: Not on file   Physical Activity: Not on file   Stress: Not on file   Social Connections: Not on file   Intimate Partner Violence: Not on file   Housing Stability: Not on file     Allergies   Allergen Reactions    Pollen Extracts Other     Current Outpatient Medications on File Prior to Visit   Medication Sig Dispense Refill    b complex 0.4 mg tablet Take 1 tablet by mouth once daily.      calcium carbonate/vitamin D3 (CALCIUM 600 WITH VITAMIN D3 ORAL) Take by mouth once daily.      DULoxetine (Cymbalta) 20 mg  "DR capsule Take 2 capsules (40 mg) by mouth once daily at bedtime.      ferrous sulfate, 325 mg ferrous sulfate, tablet Take 1 tablet (325 mg) by mouth 3 (three) times a week. 90 tablet 0    miscellaneous medical supply misc 1 Device once daily. Please dispense 1 bone stimulator 1 each 0    propranolol (Inderal) 10 mg tablet if needed. Prn \"panic attacks\"      tirzepatide, weight loss, (Zepbound) 10 mg/0.5 mL injection Inject 10 mg under the skin every 7 days.      vitamin E acetate (VITAMIN E ORAL) Take by mouth once daily.      [DISCONTINUED] semaglutide (Ozempic) 0.25 mg or 0.5 mg(2 mg/1.5 mL) pen injector Inject 0.5 mg under the skin 1 (one) time per week.       No current facility-administered medications on file prior to visit.     Immunization History   Administered Date(s) Administered    Flu vaccine (IIV4), preservative free *Check age/dose* 11/13/2023    Pfizer Purple Cap SARS-CoV-2 03/27/2021, 04/18/2021     Patient's medical history was reviewed and updated either before or during this encounter.       Shelly Goldsmith, APRN-CNP  "

## 2024-11-22 LAB
COMMENTS - MP RESULT TYPE: NORMAL
SCAN RESULT: NORMAL

## 2024-12-05 ENCOUNTER — TELEMEDICINE (OUTPATIENT)
Dept: HEMATOLOGY/ONCOLOGY | Facility: CLINIC | Age: 44
End: 2024-12-05
Payer: COMMERCIAL

## 2024-12-05 DIAGNOSIS — C83.30 DIFFUSE LARGE B-CELL LYMPHOMA, UNSPECIFIED BODY REGION (MULTI): ICD-10-CM

## 2024-12-05 PROCEDURE — 99215 OFFICE O/P EST HI 40 MIN: CPT | Performed by: INTERNAL MEDICINE

## 2024-12-05 ASSESSMENT — PAIN SCALES - GENERAL: PAINLEVEL_OUTOF10: 0-NO PAIN

## 2024-12-05 ASSESSMENT — COLUMBIA-SUICIDE SEVERITY RATING SCALE - C-SSRS
6. HAVE YOU EVER DONE ANYTHING, STARTED TO DO ANYTHING, OR PREPARED TO DO ANYTHING TO END YOUR LIFE?: NO
2. HAVE YOU ACTUALLY HAD ANY THOUGHTS OF KILLING YOURSELF?: NO
1. IN THE PAST MONTH, HAVE YOU WISHED YOU WERE DEAD OR WISHED YOU COULD GO TO SLEEP AND NOT WAKE UP?: NO

## 2024-12-05 NOTE — PROGRESS NOTES
Patient ID: Jaz Low is a 44 y.o. female.  Referring Physician: Mark Baca MD  [address]  [address] [place] [address]  Primary Care Provider: SARAH Pascual  Referral Reason: Large B Cell Lymphoma dx 9/2021     Subjective:  Patient returns today for routine follow-up evaluation for history of stage IV diffuse large B-cell lymphoma. Status post R-CHOP x6. Remission by PET scan February 16, 2022.   On presentation today she is feeling well. She denies B symptoms of fever, drenching night sweats, or weight loss.  No lymphadenopathy.    Heme/Onc History:  June 2021, developed left lower pelvis, hip pain   (Status post muscle relaxants, steroids and a chiropractor visit)  July 9, 2021 x-ray of hip negative   July 14, 2021 CT pelvis negative   Progressive pain requiring tramadol and then Percocet   August 31, 2021 MRI left hip, destructive lesion in the acetabulum with fracture  September 30, 2021 PET scan:diffuse hypermetabolic activity of the spleen, with a highly hypermetabolic intrasplenic mass consistent with diffuse large B-cell lymphoma.  There is also hypermetabolic perisplenic and retroperitoneal lymph nodes and hypermetabolic  lytic lesion of the left iliac bone which involves the acetabulum.  This would be considered stage IV disease   Port-A-Cath placed   Echocardiogram: EF: 60 to 65%   October 15, 2021 R-CHOP cycle 1   After cycle 2 admitted to the hospital with neutropenic fever and COVID-positive   November 17, 2021 discharged   January 28, 2022 received cycle 6 CHOP  December 10, 2021 MRI hip: Partially improved   February 16, 2022, PET scan: PET negative   August 10, 2022: PET scan: No recurrent disease   July 2022: Echo: EF 55 to 60%   August 2022, Port-A-Cath removed    CT guided biopsy of left supra-acetabular 9/14/2021.    Pathology: Left supra-acetabular lesion: Atypical lymphoid proliferation most consistent with large B-cell lymphoma   Histologic sections showed fragments  of skeletal muscle and atypical proliferation consisting of large lymphoid cells with scant to moderate cytoplasm, hyperchromatic nuclei and prominent nucleoli admixed with scattered small lymphocytes, histiocytes and plasma cells in a necrotic background. There is an extensive crush artifact limiting the morphologic evaluation.               Cell of origin by Umberto criteria: Non-germinal center(CD10-, BCL6 minus, mum 1+)   C-MYC/BCL2: Not a double expressor (C-MYC-, BCL2-).                AE1/AE3 immunostain is negative.                                                            CD45 immunostain highlights lymphoid cells.                                                       CD20 and PAX5 immunostains highlights increased number of neoplastic B-cells.                                                 CD3 and CD5 immunostains highlights background T-cells.                                                 CD10 and BCL6 immunostains are negative in neoplastic B-cells.                                                     MUM1 immunostain highlights neoplastic B-cells (30-40%).                                                C-MYC immunostain is negative in neoplastic B-cells (20-30%).                                                       BCL-2 immunostain highlights T-cells.                                                    CD68 and  immunostains highlights scattered histiocytes.                                                      Ki67 immunostain shows high proliferation index (60-70%).                                               CD30 immunostain is negative.                               CYTOGENETIC/MOLECULAR STUDIES: B-cell rearrangement studies; FISH studies for BCL2, BCL6 and C-MYC  A.  LEFT SUPRA ACETABULAR LESION:                                                  --   CD21 and CD23 immunostains showed absence of follicular dendritic meshworks.                                                  --   CyclinD1  "immunostain is negative in neoplastic B-cells.          B-Cell Gene Rearrangement-Positive  MYC and IGH/MYC Rearrangements NOT DETECTED                                                              GENETIC: FISH for MYC, BCL2, and BCL6 gene rearrangements to formally evaluate for a \"double/triple hit lymphoma\" are pending at the time of this report.                        Past Medical History:   Past Medical History:   Diagnosis Date    Anemia     Anxiety     Cancer (Multi)     Depression     Hx antineoplastic chemo 10/15/2021     Social History:   Social History     Socioeconomic History    Marital status:      Spouse name: Not on file    Number of children: Not on file    Years of education: Not on file    Highest education level: Not on file   Occupational History    Not on file   Tobacco Use    Smoking status: Former     Types: Cigarettes     Passive exposure: Past    Smokeless tobacco: Never   Vaping Use    Vaping status: Never Used   Substance and Sexual Activity    Alcohol use: Not Currently    Drug use: Never    Sexual activity: Not on file   Other Topics Concern    Not on file   Social History Narrative    Not on file     Social Drivers of Health     Financial Resource Strain: Not on file   Food Insecurity: Not on file   Transportation Needs: Not on file   Physical Activity: Not on file   Stress: Not on file   Social Connections: Not on file   Intimate Partner Violence: Not on file   Housing Stability: Not on file     Surgical History:   Past Surgical History:   Procedure Laterality Date    CT GUIDED PERCUTANEOUS BIOPSY MUSCLE  9/14/2021    CT GUIDED PERCUTANEOUS BIOPSY MUSCLE 9/14/2021 [facility]     Family History:   Family History   Problem Relation Name Age of Onset    Other (Diabetes) Mother Marti Munoz     Breast cancer Mother Marti Munoz 63    Hyperlipidemia Father      Colon cancer Maternal Grandmother Jacey Daniels       reports that she has quit smoking. Her smoking use " included cigarettes. She has been exposed to tobacco smoke. She has never used smokeless tobacco.  Oncology Family history: Cancer-related family history includes Breast cancer (age of onset: 63) in her mother; Colon cancer in her maternal grandmother.    Review Of Systems:  As stated per in HPI; otherwise all other 12 point ROS are negative    Physical Exam:  There were no vitals taken for this visit.  BSA: There is no height or weight on file to calculate BSA.  General: awake/alert/oriented x3, no distress, alert and cooperative  Head: Short hair fully covering scalp. Symmetric facial expressions  Eyes: PERRL, EOMI, clear sclera, eyebrows present.  Ears/Nose/Mouth/Throat:  Oral mucous membranes moist. No oral ulcers. No palpable pre/post-auricular lymph nodes  Neck: No palpable cervical chain lymph nodes  Respiratory: unlabored breathing on room air, good chest expansion, thorax symmetric  Cardio: Regular rate and rhythm, normal S1 and S2, radial pulses symmetric  GI: Nondistended, soft, non-tender abdomen  Musculoskeletal: Normal muscle bulk and tone, ROM intact, no joint swelling.  Rises from chair and walks unassisted.  Extremities: No ankle swelling, no arm or leg wounds  Neuro: Alert, cognition intact, speech normal. Facial expressions symmetric.  No motor deficits noted. Sensation intact to touch and hot/cold.   Able to stand from seated position unassisted and walks around the room unassisted.  Psychological: Appropriate mood and behavior.  Skin: Warm and dry, no lesions, no rashes    Results:  Diagnostic Results   Lab Results   Component Value Date    WBC 7.0 11/04/2024    HGB 12.0 11/04/2024    HCT 37.2 11/04/2024    MCV 89 11/04/2024     11/04/2024     Lab Results   Component Value Date    CALCIUM 9.5 11/04/2024     11/04/2024    K 3.7 11/04/2024    CO2 28 11/04/2024     11/04/2024    BUN 17 11/04/2024    CREATININE 0.71 11/04/2024    ALT 18 11/04/2024    AST 22 11/04/2024  "      Current Outpatient Medications:     b complex 0.4 mg tablet, Take 1 tablet by mouth once daily., Disp: , Rfl:     calcium carbonate/vitamin D3 (CALCIUM 600 WITH VITAMIN D3 ORAL), Take by mouth once daily., Disp: , Rfl:     DULoxetine (Cymbalta) 20 mg DR capsule, Take 2 capsules (40 mg) by mouth once daily at bedtime., Disp: , Rfl:     ferrous sulfate, 325 mg ferrous sulfate, tablet, Take 1 tablet (325 mg) by mouth 3 (three) times a week., Disp: 90 tablet, Rfl: 0    miscellaneous medical supply [facility], 1 Device once daily. Please dispense 1 bone stimulator, Disp: 1 each, Rfl: 0    propranolol (Inderal) 10 mg tablet, if needed. Prn \"panic attacks\", Disp: , Rfl:     tirzepatide, weight loss, (Zepbound) 10 mg/0.5 mL injection, Inject 10 mg under the skin every 7 days., Disp: , Rfl:     vitamin E acetate (VITAMIN E ORAL), Take by mouth once daily., Disp: , Rfl:      Radiology:    Pathology:    Assessment/Plan:  ? DLBCL:    History of stage IV diffuse large B-cell lymphoma originating in the left acetabulum. Diagnosed September 14, 2021. PET scan with diffuse hypermetabolic activity of the spleen, with a highly hypermetabolic intrasplenic mass consistent with diffuse large B-cell lymphoma.  There is also hypermetabolic perisplenic and retroperitoneal lymph nodes and hypermetabolic  lytic lesion of the left iliac bone which involves the acetabulum. Now status post R-CHOP x6, remission by PET scan February 16, 2022.      Doing well no B symptoms or concerns about recurrent disease at this time.     No palpable Lns on exam. Nno need for imaging unless clinically indicated per NCCN guidelines. RTC in 6 months with labs    2- Iron def: low iron sat. Po iron 3 times per wek ordered for 6 months    3- Vit B12 overdose: advised to stop taking it    4- Fhx of cancer: grandmother with melanoma and colon cancer at age 52, and mother dx with breast cancer at age 50. Ambry is negative.      Diagnoses and all orders for this " visit:  Diffuse large B-cell lymphoma, unspecified body region (Multi)  -     Clinic Appointment Request Virtual Est       Performance Status: Asymptomatic    I spent more than 60 minutes for the patient today, including face-to-face conversation, pre-visit preparation, post-visit orders, and others.   Mark Baca MD

## 2024-12-18 ENCOUNTER — OFFICE VISIT (OUTPATIENT)
Dept: URGENT CARE | Age: 44
End: 2024-12-18
Payer: COMMERCIAL

## 2024-12-18 VITALS
RESPIRATION RATE: 20 BRPM | SYSTOLIC BLOOD PRESSURE: 104 MMHG | DIASTOLIC BLOOD PRESSURE: 60 MMHG | TEMPERATURE: 97.5 F | HEART RATE: 74 BPM | HEIGHT: 60 IN | OXYGEN SATURATION: 100 % | BODY MASS INDEX: 30.43 KG/M2 | WEIGHT: 155 LBS

## 2024-12-18 DIAGNOSIS — L02.91 ABSCESS: Primary | ICD-10-CM

## 2024-12-18 PROCEDURE — 10060 I&D ABSCESS SIMPLE/SINGLE: CPT | Performed by: NURSE PRACTITIONER

## 2024-12-18 PROCEDURE — 99213 OFFICE O/P EST LOW 20 MIN: CPT | Performed by: NURSE PRACTITIONER

## 2024-12-18 PROCEDURE — 3008F BODY MASS INDEX DOCD: CPT | Performed by: NURSE PRACTITIONER

## 2024-12-18 PROCEDURE — 1036F TOBACCO NON-USER: CPT | Performed by: NURSE PRACTITIONER

## 2024-12-18 RX ORDER — SULFAMETHOXAZOLE AND TRIMETHOPRIM 800; 160 MG/1; MG/1
1 TABLET ORAL 2 TIMES DAILY
Qty: 14 TABLET | Refills: 0 | Status: SHIPPED | OUTPATIENT
Start: 2024-12-18 | End: 2024-12-25

## 2024-12-18 NOTE — PROGRESS NOTES
Subjective   Patient ID: Jaz Low is a 44 y.o. female. They present today with a chief complaint of Abrasion (Pt c/o sore/abrasion from friction from underwear by  scar on lower abdomen x several days. Area is size of a quarter, pus, red and inflamed.).    History of Present Illness  HPI    Past Medical History  Allergies as of 2024 - Reviewed 2024   Allergen Reaction Noted    Pollen extracts Other 2024       (Not in a hospital admission)       Past Medical History:   Diagnosis Date    Anemia     Anxiety     Cancer (Multi)     Depression     Hx antineoplastic chemo 10/15/2021       Past Surgical History:   Procedure Laterality Date    CT GUIDED PERCUTANEOUS BIOPSY MUSCLE  2021    CT GUIDED PERCUTANEOUS BIOPSY MUSCLE 2021 [facility]        reports that she has quit smoking. Her smoking use included cigarettes. She has been exposed to tobacco smoke. She has never used smokeless tobacco. She reports that she does not currently use alcohol. She reports that she does not use drugs.    Review of Systems  Review of Systems                               Objective    Vitals:    24 1258   BP: 104/60   BP Location: Right arm   Patient Position: Sitting   BP Cuff Size: Adult   Pulse: 74   Resp: 20   Temp: 36.4 °C (97.5 °F)   TempSrc: Oral   SpO2: 100%   Weight: 70.3 kg (155 lb)   Height: 1.524 m (5')     No LMP recorded. Patient is postmenopausal.    Physical Exam  Skin:     Findings: Abscess present.                Incision and Drainage    Date/Time: 2024 4:22 PM    Performed by: SARAH Hunt  Authorized by: SARAH Hunt    Consent:     Consent obtained:  Verbal    Consent given by:  Patient    Risks, benefits, and alternatives were discussed: yes      Risks discussed:  Bleeding, incomplete drainage and pain    Alternatives discussed:  No treatment  Universal protocol:     Procedure explained and questions answered to patient or proxy's  satisfaction: yes      Patient identity confirmed:  Verbally with patient  Location:     Type:  Abscess    Size:  1 cm    Location:  Trunk  Pre-procedure details:     Skin preparation:  Povidone-iodine  Anesthesia:     Anesthesia method:  Local infiltration    Local anesthetic:  Lidocaine 2% WITH epi  Procedure type:     Complexity:  Simple  Procedure details:     Ultrasound guidance: no      Incision types:  Stab incision    Incision depth:  Subcutaneous    Drainage:  Purulent    Drainage amount:  Scant    Wound treatment:  Wound left open    Packing materials:  None  Post-procedure details:     Procedure completion:  Tolerated      Point of Care Test & Imaging Results from this visit  No results found for this visit on 12/18/24.   No results found.    Diagnostic study results (if any) were reviewed by SARAH Gunderson.    Assessment/Plan   Allergies, medications, history, and pertinent labs/EKGs/Imaging reviewed by SARAH Gunderson.     Medical Decision Making    Abscess underneath umbilicus  Edema, erythema, and tenderness with small amount of indurated skin, roughly 1 cm  -Verbal consent obtained with patient.  Area cleansed with Betadine and lidocaine 2% with epi used.  Single small stab incision was made to which purulent drainage was irrigated from wound.  No packing needed, did not tunnel.  Did educate patient on signs and symptoms of worsening infection and concern for cellulitis such as worsening erythema, edema, warmth, and tenderness to which she verbalized understanding.  Bactrim given.    Orders and Diagnoses  Diagnoses and all orders for this visit:  Abscess  -     sulfamethoxazole-trimethoprim (Bactrim DS) 800-160 mg tablet; Take 1 tablet by mouth 2 times a day for 7 days.      Medical Admin Record      Patient disposition: Home    Electronically signed by SARAH Gunderson  4:22 PM

## 2024-12-18 NOTE — PATIENT INSTRUCTIONS
Please monitor the abscess.  You want to monitor for the redness becoming larger/growing, increased warmth and tenderness.  Expect to have some purulent drainage from site as the abscess is treated, but he started having worsening drainage please return to clinic.

## 2025-04-30 ENCOUNTER — TELEPHONE (OUTPATIENT)
Dept: HEMATOLOGY/ONCOLOGY | Facility: CLINIC | Age: 45
End: 2025-04-30
Payer: COMMERCIAL

## 2025-05-16 ENCOUNTER — OFFICE VISIT (OUTPATIENT)
Dept: HEMATOLOGY/ONCOLOGY | Facility: CLINIC | Age: 45
End: 2025-05-16
Payer: COMMERCIAL

## 2025-05-16 ENCOUNTER — LAB (OUTPATIENT)
Dept: LAB | Facility: CLINIC | Age: 45
End: 2025-05-16
Payer: COMMERCIAL

## 2025-05-16 VITALS
DIASTOLIC BLOOD PRESSURE: 70 MMHG | WEIGHT: 147.05 LBS | RESPIRATION RATE: 16 BRPM | SYSTOLIC BLOOD PRESSURE: 101 MMHG | OXYGEN SATURATION: 98 % | BODY MASS INDEX: 28.72 KG/M2 | TEMPERATURE: 97.5 F | HEART RATE: 79 BPM

## 2025-05-16 DIAGNOSIS — C83.30 DIFFUSE LARGE B-CELL LYMPHOMA, UNSPECIFIED BODY REGION (MULTI): ICD-10-CM

## 2025-05-16 LAB
25(OH)D3 SERPL-MCNC: 50 NG/ML (ref 30–100)
ALBUMIN SERPL BCP-MCNC: 4.1 G/DL (ref 3.4–5)
ALP SERPL-CCNC: 52 U/L (ref 33–110)
ALT SERPL W P-5'-P-CCNC: 21 U/L (ref 7–45)
ANION GAP SERPL CALC-SCNC: 13 MMOL/L (ref 10–20)
AST SERPL W P-5'-P-CCNC: 24 U/L (ref 9–39)
BASOPHILS # BLD AUTO: 0.02 X10*3/UL (ref 0–0.1)
BASOPHILS NFR BLD AUTO: 0.3 %
BILIRUB SERPL-MCNC: 0.3 MG/DL (ref 0–1.2)
BUN SERPL-MCNC: 28 MG/DL (ref 6–23)
CALCIUM SERPL-MCNC: 9.4 MG/DL (ref 8.6–10.3)
CHLORIDE SERPL-SCNC: 103 MMOL/L (ref 98–107)
CO2 SERPL-SCNC: 26 MMOL/L (ref 21–32)
CREAT SERPL-MCNC: 0.66 MG/DL (ref 0.5–1.05)
EGFRCR SERPLBLD CKD-EPI 2021: >90 ML/MIN/1.73M*2
EOSINOPHIL # BLD AUTO: 0.28 X10*3/UL (ref 0–0.7)
EOSINOPHIL NFR BLD AUTO: 4.4 %
ERYTHROCYTE [DISTWIDTH] IN BLOOD BY AUTOMATED COUNT: 13.4 % (ref 11.5–14.5)
FERRITIN SERPL-MCNC: 87 NG/ML (ref 8–150)
GLUCOSE SERPL-MCNC: 84 MG/DL (ref 74–99)
HCT VFR BLD AUTO: 39.2 % (ref 36–46)
HGB BLD-MCNC: 12.7 G/DL (ref 12–16)
IMM GRANULOCYTES # BLD AUTO: 0.02 X10*3/UL (ref 0–0.7)
IMM GRANULOCYTES NFR BLD AUTO: 0.3 % (ref 0–0.9)
IRON SATN MFR SERPL: 32 % (ref 25–45)
IRON SERPL-MCNC: 113 UG/DL (ref 35–150)
LYMPHOCYTES # BLD AUTO: 2.29 X10*3/UL (ref 1.2–4.8)
LYMPHOCYTES NFR BLD AUTO: 35.7 %
MCH RBC QN AUTO: 29.7 PG (ref 26–34)
MCHC RBC AUTO-ENTMCNC: 32.4 G/DL (ref 32–36)
MCV RBC AUTO: 92 FL (ref 80–100)
MONOCYTES # BLD AUTO: 0.26 X10*3/UL (ref 0.1–1)
MONOCYTES NFR BLD AUTO: 4.1 %
NEUTROPHILS # BLD AUTO: 3.54 X10*3/UL (ref 1.2–7.7)
NEUTROPHILS NFR BLD AUTO: 55.2 %
NRBC BLD-RTO: 0 /100 WBCS (ref 0–0)
PLATELET # BLD AUTO: 258 X10*3/UL (ref 150–450)
POTASSIUM SERPL-SCNC: 4.2 MMOL/L (ref 3.5–5.3)
PROT SERPL-MCNC: 7.7 G/DL (ref 6.4–8.2)
RBC # BLD AUTO: 4.28 X10*6/UL (ref 4–5.2)
SODIUM SERPL-SCNC: 138 MMOL/L (ref 136–145)
TIBC SERPL-MCNC: 358 UG/DL (ref 240–445)
UIBC SERPL-MCNC: 245 UG/DL (ref 110–370)
VIT B12 SERPL-MCNC: 1030 PG/ML (ref 211–911)
WBC # BLD AUTO: 6.4 X10*3/UL (ref 4.4–11.3)

## 2025-05-16 PROCEDURE — 99215 OFFICE O/P EST HI 40 MIN: CPT | Performed by: INTERNAL MEDICINE

## 2025-05-16 PROCEDURE — 82728 ASSAY OF FERRITIN: CPT

## 2025-05-16 PROCEDURE — 82306 VITAMIN D 25 HYDROXY: CPT

## 2025-05-16 PROCEDURE — 83550 IRON BINDING TEST: CPT

## 2025-05-16 PROCEDURE — 80053 COMPREHEN METABOLIC PANEL: CPT

## 2025-05-16 PROCEDURE — 36415 COLL VENOUS BLD VENIPUNCTURE: CPT

## 2025-05-16 PROCEDURE — 82607 VITAMIN B-12: CPT

## 2025-05-16 PROCEDURE — 85025 COMPLETE CBC W/AUTO DIFF WBC: CPT

## 2025-05-16 ASSESSMENT — PAIN SCALES - GENERAL: PAINLEVEL_OUTOF10: 0-NO PAIN

## 2025-05-16 NOTE — PROGRESS NOTES
Pt seen in office today for a follow up visit with Dr. Mark Baca for management of her large B cell lymphoma.  She is  without complaints today and denies pain.     Medications, pharmacy preference and allergies were reviewed with patient and updated in the medical record by MD.     Per orders, labs were obtained today prior to her visit. She has intentionally lost appx 50 pounds in the past year. Dr. Baca is aware. She is to have a CT c/a/p and a virtual visit on 5/30/25 to review these results.    Our contact information was given to patient and they were encouraged to contact us with any questions or concerns.    Patient verbalized understanding and agreement regarding discussed information via verbal feedback. Pt escorted to scheduling.

## 2025-05-16 NOTE — PROGRESS NOTES
Patient ID: Jaz Low is a 44 y.o. female.  Referring Physician: Mark Baca MD  [id number]  [address] [place] [address]  Primary Care Provider: SARAH Pascual  Referral Reason: Large B Cell Lymphoma dx 9/2021     Subjective:  Patient returns today for routine follow-up evaluation for history of stage IV diffuse large B-cell lymphoma. Status post R-CHOP x6. Remission by PET scan February 16, 2022.   On presentation today she is feeling well. She denies B symptoms of fever, drenching night sweats, or weight loss.  No lymphadenopathy.    Heme/Onc History:  June 2021, developed left lower pelvis, hip pain   (Status post muscle relaxants, steroids and a chiropractor visit)  July 9, 2021 x-ray of hip negative   July 14, 2021 CT pelvis negative   Progressive pain requiring tramadol and then Percocet   August 31, 2021 MRI left hip, destructive lesion in the acetabulum with fracture  September 30, 2021 PET scan:diffuse hypermetabolic activity of the spleen, with a highly hypermetabolic intrasplenic mass consistent with diffuse large B-cell lymphoma.  There is also hypermetabolic perisplenic and retroperitoneal lymph nodes and hypermetabolic  lytic lesion of the left iliac bone which involves the acetabulum.  This would be considered stage IV disease   Port-A-Cath placed   Echocardiogram: EF: 60 to 65%   October 15, 2021 R-CHOP cycle 1   After cycle 2 admitted to the hospital with neutropenic fever and COVID-positive   November 17, 2021 discharged   January 28, 2022 received cycle 6 CHOP  December 10, 2021 MRI hip: Partially improved   February 16, 2022, PET scan: PET negative   August 10, 2022: PET scan: No recurrent disease   July 2022: Echo: EF 55 to 60%   August 2022, Port-A-Cath removed    CT guided biopsy of left supra-acetabular 9/14/2021.    Pathology: Left supra-acetabular lesion: Atypical lymphoid proliferation most consistent with large B-cell lymphoma   Histologic sections showed fragments  of skeletal muscle and atypical proliferation consisting of large lymphoid cells with scant to moderate cytoplasm, hyperchromatic nuclei and prominent nucleoli admixed with scattered small lymphocytes, histiocytes and plasma cells in a necrotic background. There is an extensive crush artifact limiting the morphologic evaluation.               Cell of origin by Umberto criteria: Non-germinal center(CD10-, BCL6 minus, mum 1+)   C-MYC/BCL2: Not a double expressor (C-MYC-, BCL2-).                AE1/AE3 immunostain is negative.                                                            CD45 immunostain highlights lymphoid cells.                                                       CD20 and PAX5 immunostains highlights increased number of neoplastic B-cells.                                                 CD3 and CD5 immunostains highlights background T-cells.                                                 CD10 and BCL6 immunostains are negative in neoplastic B-cells.                                                     MUM1 immunostain highlights neoplastic B-cells (30-40%).                                                C-MYC immunostain is negative in neoplastic B-cells (20-30%).                                                       BCL-2 immunostain highlights T-cells.                                                    CD68 and  immunostains highlights scattered histiocytes.                                                      Ki67 immunostain shows high proliferation index (60-70%).                                               CD30 immunostain is negative.                               CYTOGENETIC/MOLECULAR STUDIES: B-cell rearrangement studies; FISH studies for BCL2, BCL6 and C-MYC  A.  LEFT SUPRA ACETABULAR LESION:                                                  --   CD21 and CD23 immunostains showed absence of follicular dendritic meshworks.                                                  --   CyclinD1  "immunostain is negative in neoplastic B-cells.          B-Cell Gene Rearrangement-Positive  MYC and IGH/MYC Rearrangements NOT DETECTED                                                              GENETIC: FISH for MYC, BCL2, and BCL6 gene rearrangements to formally evaluate for a \"double/triple hit lymphoma\" are pending at the time of this report.                        Past Medical History:   Past Medical History:   Diagnosis Date    Anemia     Anxiety     Cancer (Multi)     Depression     Hx antineoplastic chemo 10/15/2021     Social History:   Social History     Socioeconomic History    Marital status:      Spouse name: Not on file    Number of children: Not on file    Years of education: Not on file    Highest education level: Not on file   Occupational History    Not on file   Tobacco Use    Smoking status: Former     Types: Cigarettes     Passive exposure: Past    Smokeless tobacco: Never   Vaping Use    Vaping status: Never Used   Substance and Sexual Activity    Alcohol use: Not Currently    Drug use: Never    Sexual activity: Not on file   Other Topics Concern    Not on file   Social History Narrative    Not on file     Social Drivers of Health     Financial Resource Strain: Not on file   Food Insecurity: Not on file   Transportation Needs: Not on file   Physical Activity: Not on file   Stress: Not on file   Social Connections: Not on file   Intimate Partner Violence: Not on file   Housing Stability: Not on file     Surgical History:   Past Surgical History:   Procedure Laterality Date    CT GUIDED PERCUTANEOUS BIOPSY MUSCLE  9/14/2021    CT GUIDED PERCUTANEOUS BIOPSY MUSCLE 9/14/2021 [facility]     Family History:   Family History   Problem Relation Name Age of Onset    Other (Diabetes) Mother Marti Munoz     Breast cancer Mother Marti Munoz 63    Hyperlipidemia Father      Colon cancer Maternal Grandmother Jacey Daniels       reports that she has quit smoking. Her smoking use " included cigarettes. She has been exposed to tobacco smoke. She has never used smokeless tobacco.  Oncology Family history: Cancer-related family history includes Breast cancer (age of onset: 63) in her mother; Colon cancer in her maternal grandmother.    Review Of Systems:  As stated per in HPI; otherwise all other 12 point ROS are negative    Physical Exam:  /70 (BP Location: Left arm, Patient Position: Sitting, BP Cuff Size: Adult long)   Pulse 79   Temp 36.4 °C (97.5 °F) (Temporal)   Resp 16   Wt 66.7 kg (147 lb 0.8 oz)   SpO2 98%   BMI 28.72 kg/m²   BSA: 1.68 meters squared  General: awake/alert/oriented x3, no distress, alert and cooperative  Head: Short hair fully covering scalp. Symmetric facial expressions  Eyes: PERRL, EOMI, clear sclera, eyebrows present.  Ears/Nose/Mouth/Throat:  Oral mucous membranes moist. No oral ulcers. No palpable pre/post-auricular lymph nodes  Neck: No palpable cervical chain lymph nodes  Respiratory: unlabored breathing on room air, good chest expansion, thorax symmetric  Cardio: Regular rate and rhythm, normal S1 and S2, radial pulses symmetric  GI: Nondistended, soft, non-tender abdomen  Musculoskeletal: Normal muscle bulk and tone, ROM intact, no joint swelling.  Rises from chair and walks unassisted.  Extremities: No ankle swelling, no arm or leg wounds  Neuro: Alert, cognition intact, speech normal. Facial expressions symmetric.  No motor deficits noted. Sensation intact to touch and hot/cold.   Able to stand from seated position unassisted and walks around the room unassisted.  Psychological: Appropriate mood and behavior.  Skin: Warm and dry, no lesions, no rashes    Results:  Diagnostic Results   Lab Results   Component Value Date    WBC 6.4 05/16/2025    HGB 12.7 05/16/2025    HCT 39.2 05/16/2025    MCV 92 05/16/2025     05/16/2025     Lab Results   Component Value Date    CALCIUM 9.4 05/16/2025     05/16/2025    K 4.2 05/16/2025    CO2 26  "05/16/2025     05/16/2025    BUN 28 (H) 05/16/2025    CREATININE 0.66 05/16/2025    ALT 21 05/16/2025    AST 24 05/16/2025       Current Outpatient Medications:     b complex 0.4 mg tablet, Take 1 tablet by mouth once daily., Disp: , Rfl:     calcium carbonate/vitamin D3 (CALCIUM 600 WITH VITAMIN D3 ORAL), Take by mouth once daily., Disp: , Rfl:     DULoxetine (Cymbalta) 20 mg DR capsule, Take 2 capsules (40 mg) by mouth once daily at bedtime., Disp: , Rfl:     ferrous sulfate, 325 mg ferrous sulfate, tablet, Take 1 tablet (325 mg) by mouth 3 (three) times a week., Disp: 90 tablet, Rfl: 0    miscellaneous medical supply misc, 1 Device once daily. Please dispense 1 bone stimulator, Disp: 1 each, Rfl: 0    propranolol (Inderal) 10 mg tablet, if needed. Prn \"panic attacks\", Disp: , Rfl:     tirzepatide, weight loss, (Zepbound) 10 mg/0.5 mL injection, Inject 10 mg under the skin every 7 days., Disp: , Rfl:     vitamin E acetate (VITAMIN E ORAL), Take by mouth once daily., Disp: , Rfl:      Assessment/Plan:  ? DLBCL:    History of stage IV diffuse large B-cell lymphoma originating in the left acetabulum. Diagnosed September 14, 2021. PET scan with diffuse hypermetabolic activity of the spleen, with a highly hypermetabolic intrasplenic mass consistent with diffuse large B-cell lymphoma.  There is also hypermetabolic perisplenic and retroperitoneal lymph nodes and hypermetabolic  lytic lesion of the left iliac bone which involves the acetabulum. Now status post R-CHOP x6, remission by PET scan February 16, 2022.      05/16/25: 40 lbs weight loss in 6 months. Did not call me before. CT CAP restage lymphoma ordered. No other B symptoms. Labs pending today. Telemed in 2 weeks to review labs and CT scans    2- Iron def: low iron sat. Po iron 3 times per wek ordered for 6 months. Labs pending    3- Vit B12 overdose: pending    4- Fhx of cancer: grandmother with melanoma and colon cancer at age 52, and mother dx with " breast cancer at age 50. Ambry is negative.      Diagnoses and all orders for this visit:  Diffuse large B-cell lymphoma, unspecified body region (Multi)  -     Clinic Appointment Request       Performance Status: Asymptomatic    I spent more than 60 minutes for the patient today, including face-to-face conversation, pre-visit preparation, post-visit orders, and others.   Mark Baca MD

## 2025-06-06 ENCOUNTER — HOSPITAL ENCOUNTER (OUTPATIENT)
Dept: RADIOLOGY | Facility: CLINIC | Age: 45
Discharge: HOME | End: 2025-06-06
Payer: COMMERCIAL

## 2025-06-06 DIAGNOSIS — C83.30 DIFFUSE LARGE B-CELL LYMPHOMA, UNSPECIFIED BODY REGION (MULTI): ICD-10-CM

## 2025-06-06 PROCEDURE — 74177 CT ABD & PELVIS W/CONTRAST: CPT | Performed by: RADIOLOGY

## 2025-06-06 PROCEDURE — 71260 CT THORAX DX C+: CPT | Performed by: RADIOLOGY

## 2025-06-06 PROCEDURE — 2550000001 HC RX 255 CONTRASTS: Performed by: INTERNAL MEDICINE

## 2025-06-06 PROCEDURE — 71260 CT THORAX DX C+: CPT

## 2025-06-06 RX ADMIN — IOHEXOL 75 ML: 350 INJECTION, SOLUTION INTRAVENOUS at 10:13

## 2025-06-18 ENCOUNTER — TELEMEDICINE (OUTPATIENT)
Dept: HEMATOLOGY/ONCOLOGY | Facility: CLINIC | Age: 45
End: 2025-06-18
Payer: COMMERCIAL

## 2025-06-18 DIAGNOSIS — C83.30 DIFFUSE LARGE B-CELL LYMPHOMA, UNSPECIFIED BODY REGION (MULTI): ICD-10-CM

## 2025-06-18 PROCEDURE — 99214 OFFICE O/P EST MOD 30 MIN: CPT | Performed by: INTERNAL MEDICINE

## 2025-06-26 ENCOUNTER — ANCILLARY PROCEDURE (OUTPATIENT)
Dept: URGENT CARE | Age: 45
End: 2025-06-26
Payer: COMMERCIAL

## 2025-06-26 ENCOUNTER — OFFICE VISIT (OUTPATIENT)
Dept: URGENT CARE | Age: 45
End: 2025-06-26
Payer: COMMERCIAL

## 2025-06-26 VITALS
HEIGHT: 60 IN | RESPIRATION RATE: 18 BRPM | BODY MASS INDEX: 28.47 KG/M2 | DIASTOLIC BLOOD PRESSURE: 77 MMHG | HEART RATE: 75 BPM | TEMPERATURE: 98 F | OXYGEN SATURATION: 99 % | SYSTOLIC BLOOD PRESSURE: 103 MMHG | WEIGHT: 145 LBS

## 2025-06-26 DIAGNOSIS — J40 BRONCHITIS: Primary | ICD-10-CM

## 2025-06-26 DIAGNOSIS — R05.1 ACUTE COUGH: ICD-10-CM

## 2025-06-26 PROCEDURE — 71046 X-RAY EXAM CHEST 2 VIEWS: CPT | Performed by: PHYSICIAN ASSISTANT

## 2025-06-26 RX ORDER — BENZONATATE 200 MG/1
200 CAPSULE ORAL 2 TIMES DAILY
Qty: 10 CAPSULE | Refills: 0 | Status: SHIPPED | OUTPATIENT
Start: 2025-06-26 | End: 2025-07-01

## 2025-06-26 RX ORDER — AZITHROMYCIN 250 MG/1
TABLET, FILM COATED ORAL
Qty: 6 TABLET | Refills: 0 | Status: SHIPPED | OUTPATIENT
Start: 2025-06-26

## 2025-06-26 NOTE — PROGRESS NOTES
Subjective   Patient ID: Jaz Low is a 44 y.o. female. They present today with a chief complaint of Cough (Cough/Runny Nose/Nasal congestion/RT Ear x 2 weeks).    History of Present Illness  Patient is a 44-year-old female who presents for cough, runny nose and nasal congestion for the past 2 weeks.  Notes son at bedside with similar symptoms.  Denies any fevers, chills, shortness of breath, chest pain, lower extremity swelling.    Past Medical History  Allergies as of 06/26/2025 - Reviewed 06/26/2025   Allergen Reaction Noted    Pollen extracts Other 05/01/2024       Prescriptions Prior to Admission[1]     Medical History[2]    Surgical History[3]     reports that she has quit smoking. Her smoking use included cigarettes. She has been exposed to tobacco smoke. She has never used smokeless tobacco. She reports that she does not currently use alcohol. She reports that she does not use drugs.    Review of Systems  ROS is negative unless otherwise stated in HPI.         Objective    Vitals:    06/26/25 1807   BP: 103/77   BP Location: Right arm   Patient Position: Sitting   Pulse: 75   Resp: 18   Temp: 36.7 °C (98 °F)   TempSrc: Oral   SpO2: 99%   Weight: 65.8 kg (145 lb)   Height: (!) 1.524 m (5')     No LMP recorded. Patient is postmenopausal.      VS: As documented in the triage note and EMR flowsheet from this visit was reviewed  General: Well appearing. No acute distress.   Eyes:  Extraocular movements grossly intact. No scleral icterus.   Head: Atraumatic. Normocephalic.     Neck: No meningismus. No gross masses. Full movement through range of motion  CV: Regular rhythm. No murmurs, rubs, gallops appreciated.   Resp: Mild rhonchorous breath sounds heard on the left.  No respiratory distress.    GI: Nontender. Soft. No masses. No rebound, rigidity or guarding.   MSK: Symmetric muscle bulk. No gross step offs or deformities.  Skin: Warm, dry. No rashes  Neuro: CN II-VII intact. A&O x3. Speech fluent. Alert.  Moving all extremities. Ambulates with normal gait  Psych: Appropriate mood and affect for situation      Point of Care Test & Imaging Results from this visit  No results found for this visit on 06/26/25.   Imaging  XR chest 2 views  Result Date: 6/26/2025  No acute cardiopulmonary process.     MACRO: None.   Signed by: Shane Christensen 6/26/2025 6:41 PM Dictation workstation:   [id number]      Cardiology, Vascular, and Other Imaging  No other imaging results found for the past 2 days      Diagnostic study results (if any) were reviewed by Alyce Hernandez PA-C.    Assessment/Plan   Allergies, medications, history, and pertinent labs/EKGs/Imaging reviewed by Alyce Hernandez PA-C.     Medical Decision Making  Patient is a 44-year-old female who presents with cough for the past 2 weeks.  On examination, patient well-appearing.  Vitals are stable.  Satting 99% on room air.  No respiratory distress.  Cardiopulmonary examination did reveal some mild rhonchorous breath sounds heard during expiration on the left.  Therefore chest x-ray was obtained and was negative.  Given longevity of patient's symptomswill treat with azithromycin and benzonatate. Patient informed of the diagnosis.  They are agreeable to the plan as discussed above.  Patient given the opportunity to ask questions.  All of the patient's questions were answered. Given precautions in which to seek attention in the emergency department. Discussed follow up with PCP or other appropriate clinician.      Orders and Diagnoses  Diagnoses and all orders for this visit:  Bronchitis  -     XR chest 2 views; Future  -     azithromycin (Zithromax Z-Nash) 250 mg tablet; Follow Z-nash instructions: 500mg on day #1, then 250mg daily for days #2, 3, 4, and 5.  -     benzonatate (Tessalon) 200 mg capsule; Take 1 capsule (200 mg) by mouth 2 times a day for 5 days. Do not crush or chew.      Medical Admin Record      Patient disposition: Home    Electronically signed by  Alyce Hernandez PA-C  7:09 PM           [1] (Not in a hospital admission)  [2]   Past Medical History:  Diagnosis Date    Anemia     Anxiety     Cancer (Multi)     Depression     Hx antineoplastic chemo 10/15/2021   [3]   Past Surgical History:  Procedure Laterality Date    CT GUIDED PERCUTANEOUS BIOPSY MUSCLE  9/14/2021    CT GUIDED PERCUTANEOUS BIOPSY MUSCLE 9/14/2021 [facility]